# Patient Record
Sex: MALE | Race: OTHER | Employment: FULL TIME | URBAN - METROPOLITAN AREA
[De-identification: names, ages, dates, MRNs, and addresses within clinical notes are randomized per-mention and may not be internally consistent; named-entity substitution may affect disease eponyms.]

---

## 2017-01-02 ENCOUNTER — APPOINTMENT (OUTPATIENT)
Dept: PHYSICAL THERAPY | Age: 37
End: 2017-01-02
Attending: ORTHOPAEDIC SURGERY

## 2017-01-04 ENCOUNTER — APPOINTMENT (OUTPATIENT)
Dept: PHYSICAL THERAPY | Age: 37
End: 2017-01-04
Attending: ORTHOPAEDIC SURGERY

## 2017-02-02 RX ORDER — TRIAMTERENE AND HYDROCHLOROTHIAZIDE 37.5; 25 MG/1; MG/1
1 CAPSULE ORAL EVERY MORNING
Qty: 30 CAPSULE | Refills: 0 | Status: SHIPPED | OUTPATIENT
Start: 2017-02-02 | End: 2017-03-03

## 2017-02-09 ENCOUNTER — HOSPITAL ENCOUNTER (OUTPATIENT)
Age: 37
Discharge: HOME | End: 2017-02-09
Payer: COMMERCIAL

## 2017-02-09 VITALS
SYSTOLIC BLOOD PRESSURE: 146 MMHG | OXYGEN SATURATION: 98 % | DIASTOLIC BLOOD PRESSURE: 93 MMHG | TEMPERATURE: 98 F | RESPIRATION RATE: 16 BRPM | HEART RATE: 85 BPM

## 2017-02-09 DIAGNOSIS — S61.411A LACERATION OF HAND, RIGHT, INITIAL ENCOUNTER: Primary | ICD-10-CM

## 2017-02-09 PROCEDURE — 12002 RPR S/N/AX/GEN/TRNK2.6-7.5CM: CPT

## 2017-02-09 PROCEDURE — 99214 OFFICE O/P EST MOD 30 MIN: CPT

## 2017-02-09 PROCEDURE — 90471 IMMUNIZATION ADMIN: CPT

## 2017-02-09 PROCEDURE — 96372 THER/PROPH/DIAG INJ SC/IM: CPT

## 2017-02-09 RX ORDER — CEPHALEXIN 500 MG/1
500 TABLET ORAL 4 TIMES DAILY
Qty: 40 TABLET | Refills: 0 | Status: SHIPPED | OUTPATIENT
Start: 2017-02-09 | End: 2017-02-19

## 2017-02-09 NOTE — ED PROVIDER NOTES
Patient Seen in: THE [facility] In [place]    History   Patient presents with:  Laceration Abrasion (integumentary)    Stated Complaint: r hand knuckle lac    HPI    42 yo male here with c/o a laceration to the R hand MCP joint after he stuck Current:/93 mmHg  Pulse 85  Temp(Src) 98 °F (36.7 °C) (Temporal)  Resp 16  SpO2 98%        Physical Exam   Constitutional: He is oriented to person, place, and time. He appears well-developed and well-nourished. No distress.    HENT:   Head: Normoceph Current Discharge Medication List    START taking these medications    Cephalexin 500 MG Oral Tab  Take 500 mg by mouth 4 (four) times daily.   Qty: 40 tablet Refills: 0

## 2017-02-09 NOTE — ED INITIAL ASSESSMENT (HPI)
States just sustained laceration to right 3rd finger mp reaching under seat of his car - \"hit something\"  Bleeding controlled. Laceration smooth.

## 2017-02-11 ENCOUNTER — APPOINTMENT (OUTPATIENT)
Dept: LAB | Age: 37
End: 2017-02-11
Attending: INTERNAL MEDICINE
Payer: COMMERCIAL

## 2017-02-11 DIAGNOSIS — E78.1 HYPERTRIGLYCERIDEMIA WITHOUT HYPERCHOLESTEROLEMIA: ICD-10-CM

## 2017-02-11 DIAGNOSIS — IMO0001 ELEVATED BLOOD PRESSURE: ICD-10-CM

## 2017-02-11 DIAGNOSIS — Z00.00 PREVENTATIVE HEALTH CARE: ICD-10-CM

## 2017-02-11 LAB
BUN BLD-MCNC: 13 MG/DL (ref 8–20)
CALCIUM BLD-MCNC: 8.5 MG/DL (ref 8.3–10.3)
CHLORIDE: 102 MMOL/L (ref 101–111)
CHOLEST SMN-MCNC: 135 MG/DL (ref ?–200)
CO2: 28 MMOL/L (ref 22–32)
CREAT BLD-MCNC: 1.31 MG/DL (ref 0.7–1.3)
GLUCOSE BLD-MCNC: 83 MG/DL (ref 70–99)
HDLC SERPL-MCNC: 55 MG/DL (ref 45–?)
HDLC SERPL: 2.45 {RATIO} (ref ?–4.97)
LDLC SERPL CALC-MCNC: 48 MG/DL (ref ?–130)
NONHDLC SERPL-MCNC: 80 MG/DL (ref ?–130)
POTASSIUM SERPL-SCNC: 3.6 MMOL/L (ref 3.6–5.1)
SODIUM SERPL-SCNC: 137 MMOL/L (ref 136–144)
TRIGLYCERIDES: 159 MG/DL (ref ?–150)
VLDL: 32 MG/DL (ref 5–40)

## 2017-02-11 PROCEDURE — 80048 BASIC METABOLIC PNL TOTAL CA: CPT

## 2017-02-11 PROCEDURE — 80061 LIPID PANEL: CPT

## 2017-02-11 PROCEDURE — 36415 COLL VENOUS BLD VENIPUNCTURE: CPT

## 2017-02-15 ENCOUNTER — OFFICE VISIT (OUTPATIENT)
Dept: INTERNAL MEDICINE CLINIC | Facility: CLINIC | Age: 37
End: 2017-02-15

## 2017-02-15 VITALS
HEART RATE: 95 BPM | OXYGEN SATURATION: 97 % | BODY MASS INDEX: 39.62 KG/M2 | DIASTOLIC BLOOD PRESSURE: 78 MMHG | WEIGHT: 267.5 LBS | SYSTOLIC BLOOD PRESSURE: 126 MMHG | TEMPERATURE: 98 F | RESPIRATION RATE: 16 BRPM | HEIGHT: 69 IN

## 2017-02-15 DIAGNOSIS — Z00.00 ENCOUNTER FOR PREVENTIVE HEALTH EXAMINATION: Primary | ICD-10-CM

## 2017-02-15 PROCEDURE — 99395 PREV VISIT EST AGE 18-39: CPT | Performed by: INTERNAL MEDICINE

## 2017-02-15 RX ORDER — CEPHALEXIN 500 MG/1
CAPSULE ORAL
COMMUNITY
Start: 2017-02-09 | End: 2017-02-15

## 2017-02-15 NOTE — PATIENT INSTRUCTIONS
We performed your physical today. Your lab work looks fine. Two things were slightly off - triglycerides (159, normal <150) and creatinine (kidney test, 1.31, normal <1.30). We will keep an eye on these for now.   Keep the focus on diet and exercise for

## 2017-02-15 NOTE — PROGRESS NOTES
Navdeep Huntley is a 43year old male. HPI:   Patient presents with:  Physical  Patient presents for complete physical/wellness examination. He had lab work done earlier in the week. He is trying to increase his activity levels.   Starting with a person 126/78 mmHg  Pulse 95  Temp(Src) 98.2 °F (36.8 °C) (Oral)  Resp 16  Ht 69\"  Wt 267 lb 8 oz  BMI 39.48 kg/m2  SpO2 97%  GENERAL: Alert and oriented, well developed, well nourished,in no apparent distress  SKIN: well healing wound right hand/knuckle, suture

## 2017-02-18 ENCOUNTER — OFFICE VISIT (OUTPATIENT)
Dept: INTERNAL MEDICINE CLINIC | Facility: CLINIC | Age: 37
End: 2017-02-18

## 2017-02-18 VITALS
BODY MASS INDEX: 37.35 KG/M2 | OXYGEN SATURATION: 95 % | SYSTOLIC BLOOD PRESSURE: 128 MMHG | DIASTOLIC BLOOD PRESSURE: 88 MMHG | HEART RATE: 87 BPM | HEIGHT: 71 IN | WEIGHT: 266.75 LBS | TEMPERATURE: 99 F

## 2017-02-18 DIAGNOSIS — Z51.89 VISIT FOR WOUND CHECK: Primary | ICD-10-CM

## 2017-02-18 DIAGNOSIS — Z48.02 VISIT FOR SUTURE REMOVAL: ICD-10-CM

## 2017-02-18 DIAGNOSIS — I10 ESSENTIAL HYPERTENSION: ICD-10-CM

## 2017-02-18 PROCEDURE — 99213 OFFICE O/P EST LOW 20 MIN: CPT | Performed by: INTERNAL MEDICINE

## 2017-02-18 NOTE — PROGRESS NOTES
Shaune Bamberger is a 43year old male. HPI:   Patient presents with:  Laceration Abrasion (integumentary)  Patient presents for wound check. He had a laceration last week, was sutured at immediate care on 2/9/17.   Still with some mild erythema around woun well  HEENT: atraumatic, PERRLA, EOMI, normal lid and conjunctiva  LUNGS: clear to auscultation bilaterally, no wheezing/rubs  CARDIO: RRR without murmurs. No clubbing, cyanosis or edema.   GI: soft non tender nondistended no hepatosplenomegaly, bowel soun

## 2017-03-04 RX ORDER — TRIAMTERENE AND HYDROCHLOROTHIAZIDE 37.5; 25 MG/1; MG/1
CAPSULE ORAL
Qty: 30 CAPSULE | Refills: 0 | Status: SHIPPED | OUTPATIENT
Start: 2017-03-04 | End: 2017-04-02

## 2017-04-03 RX ORDER — TRIAMTERENE AND HYDROCHLOROTHIAZIDE 37.5; 25 MG/1; MG/1
CAPSULE ORAL
Qty: 30 CAPSULE | Refills: 0 | Status: SHIPPED | OUTPATIENT
Start: 2017-04-03 | End: 2017-04-26

## 2017-04-26 RX ORDER — TRIAMTERENE AND HYDROCHLOROTHIAZIDE 37.5; 25 MG/1; MG/1
CAPSULE ORAL
Qty: 90 CAPSULE | Refills: 1 | Status: SHIPPED | OUTPATIENT
Start: 2017-04-26 | End: 2017-05-01

## 2017-04-28 ENCOUNTER — TELEPHONE (OUTPATIENT)
Dept: INTERNAL MEDICINE CLINIC | Facility: CLINIC | Age: 37
End: 2017-04-28

## 2017-04-28 NOTE — TELEPHONE ENCOUNTER
Pt has had insurance change recently and he needs [facility] RX refill that was sent for Triamterene-HCTZ 37.5-25 MG Oral Cap to be cancelled at [place] and resent to [facility] in [address] today if possible.   Call if any questions

## 2017-05-01 RX ORDER — TRIAMTERENE AND HYDROCHLOROTHIAZIDE 37.5; 25 MG/1; MG/1
CAPSULE ORAL
Qty: 90 CAPSULE | Refills: 1 | Status: SHIPPED | OUTPATIENT
Start: 2017-05-01 | End: 2017-11-30

## 2017-05-01 NOTE — TELEPHONE ENCOUNTER
Rx cancelled out at [facility] and sent to [facility] per pt request.

## 2017-05-05 RX ORDER — TRIAMTERENE AND HYDROCHLOROTHIAZIDE 37.5; 25 MG/1; MG/1
CAPSULE ORAL
Qty: 30 CAPSULE | Refills: 0 | OUTPATIENT
Start: 2017-05-05

## 2017-11-29 RX ORDER — TRIAMTERENE AND HYDROCHLOROTHIAZIDE 37.5; 25 MG/1; MG/1
CAPSULE ORAL
Qty: 90 CAPSULE | Refills: 1 | Status: CANCELLED | OUTPATIENT
Start: 2017-11-29

## 2017-11-30 ENCOUNTER — LAB ENCOUNTER (OUTPATIENT)
Dept: LAB | Age: 37
End: 2017-11-30
Attending: INTERNAL MEDICINE
Payer: COMMERCIAL

## 2017-11-30 ENCOUNTER — OFFICE VISIT (OUTPATIENT)
Dept: INTERNAL MEDICINE CLINIC | Facility: CLINIC | Age: 37
End: 2017-11-30

## 2017-11-30 VITALS
RESPIRATION RATE: 20 BRPM | HEIGHT: 70 IN | DIASTOLIC BLOOD PRESSURE: 74 MMHG | OXYGEN SATURATION: 98 % | SYSTOLIC BLOOD PRESSURE: 122 MMHG | HEART RATE: 88 BPM | BODY MASS INDEX: 39.16 KG/M2 | WEIGHT: 273.5 LBS | TEMPERATURE: 99 F

## 2017-11-30 DIAGNOSIS — R41.3 MEMORY PROBLEM: ICD-10-CM

## 2017-11-30 DIAGNOSIS — R41.3 MEMORY PROBLEM: Primary | ICD-10-CM

## 2017-11-30 DIAGNOSIS — I10 ESSENTIAL HYPERTENSION: ICD-10-CM

## 2017-11-30 DIAGNOSIS — E66.01 SEVERE OBESITY (BMI 35.0-35.9 WITH COMORBIDITY) (HCC): ICD-10-CM

## 2017-11-30 DIAGNOSIS — E78.1 HYPERTRIGLYCERIDEMIA WITHOUT HYPERCHOLESTEROLEMIA: ICD-10-CM

## 2017-11-30 PROCEDURE — 85025 COMPLETE CBC W/AUTO DIFF WBC: CPT

## 2017-11-30 PROCEDURE — 90471 IMMUNIZATION ADMIN: CPT | Performed by: INTERNAL MEDICINE

## 2017-11-30 PROCEDURE — 90686 IIV4 VACC NO PRSV 0.5 ML IM: CPT | Performed by: INTERNAL MEDICINE

## 2017-11-30 PROCEDURE — 84443 ASSAY THYROID STIM HORMONE: CPT

## 2017-11-30 PROCEDURE — 82306 VITAMIN D 25 HYDROXY: CPT

## 2017-11-30 PROCEDURE — 82607 VITAMIN B-12: CPT

## 2017-11-30 PROCEDURE — 36415 COLL VENOUS BLD VENIPUNCTURE: CPT

## 2017-11-30 PROCEDURE — 99214 OFFICE O/P EST MOD 30 MIN: CPT | Performed by: INTERNAL MEDICINE

## 2017-11-30 PROCEDURE — 82746 ASSAY OF FOLIC ACID SERUM: CPT

## 2017-11-30 PROCEDURE — 80053 COMPREHEN METABOLIC PANEL: CPT

## 2017-11-30 RX ORDER — TRIAMTERENE AND HYDROCHLOROTHIAZIDE 37.5; 25 MG/1; MG/1
CAPSULE ORAL
Qty: 90 CAPSULE | Refills: 2 | Status: SHIPPED | OUTPATIENT
Start: 2017-11-30 | End: 2018-08-29

## 2017-11-30 NOTE — PROGRESS NOTES
Zunilda Melendrez is a 42year old male. HPI:   Patient presents with:  Medication Follow-Up  Patient presents for follow-up on chronic medical issues. Blood pressure stable, at goal. Compliant with medications. Hyperlipidemia lifestyle controlled.  Elevate oz  02/18/17 : 266 lb 12 oz  02/15/17 : 267 lb 8 oz  12/30/16 : 266 lb 8 oz  12/02/16 : 265 lb  10/27/16 : 266 lb 12 oz    EXAM:   /74 (BP Location: Right arm, Patient Position: Sitting, Cuff Size: large)   Pulse 88   Temp 99 °F (37.2 °C) (Oral)   Re Sammy Echeverria, PT as Physical Therapist (Physical Therapy)  The patient indicates understanding of these issues and agrees to the plan.   The patient is asked to return to clinic in 6 months with myself for follow up on chronic issues, or earlier if acute issues

## 2017-11-30 NOTE — PATIENT INSTRUCTIONS
- Get blood work done now  - Let us know when you are ready to get the CT scan done    It was a pleasure seeing you in the clinic today. Thank you for choosing the [facility] office for your healthcare needs.  Please call at [phone]

## 2018-08-29 RX ORDER — TRIAMTERENE AND HYDROCHLOROTHIAZIDE 37.5; 25 MG/1; MG/1
CAPSULE ORAL
Qty: 90 CAPSULE | Refills: 0 | Status: SHIPPED | OUTPATIENT
Start: 2018-08-29 | End: 2018-11-30

## 2018-08-29 NOTE — TELEPHONE ENCOUNTER
Medication(s) to Refill:   Pending Prescriptions Disp Refills    TRIAMTERENE-HCTZ 37.5-25 MG Oral Cap [Pharmacy Med Name: TRIAMTERENE-HCTZ 37.5-25 MG CP] 90 capsule 2     Sig: TAKE 1 CAPSULE BY MOUTH EVERY MORNING.            Protocol Failed      Last Time

## 2018-10-02 ENCOUNTER — OFFICE VISIT (OUTPATIENT)
Dept: INTERNAL MEDICINE CLINIC | Facility: CLINIC | Age: 38
End: 2018-10-02
Payer: COMMERCIAL

## 2018-10-02 ENCOUNTER — HOSPITAL ENCOUNTER (OUTPATIENT)
Dept: ULTRASOUND IMAGING | Age: 38
Discharge: HOME | End: 2018-10-02
Attending: NURSE PRACTITIONER
Payer: COMMERCIAL

## 2018-10-02 ENCOUNTER — HOSPITAL ENCOUNTER (OUTPATIENT)
Dept: GENERAL RADIOLOGY | Age: 38
Discharge: HOME | End: 2018-10-02
Attending: NURSE PRACTITIONER
Payer: COMMERCIAL

## 2018-10-02 VITALS
TEMPERATURE: 100 F | RESPIRATION RATE: 16 BRPM | SYSTOLIC BLOOD PRESSURE: 128 MMHG | DIASTOLIC BLOOD PRESSURE: 86 MMHG | WEIGHT: 262 LBS | HEIGHT: 71 IN | HEART RATE: 91 BPM | OXYGEN SATURATION: 98 % | BODY MASS INDEX: 36.68 KG/M2

## 2018-10-02 DIAGNOSIS — N50.82 SCROTAL PAIN: ICD-10-CM
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
## 2018-10-02 PROCEDURE — 99213 OFFICE O/P EST LOW 20 MIN: CPT | Performed by: NURSE PRACTITIONER

## 2018-10-02 PROCEDURE — 93975 VASCULAR STUDY: CPT | Performed by: NURSE PRACTITIONER

## 2018-10-02 PROCEDURE — 70160 X-RAY EXAM OF NASAL BONES: CPT | Performed by: NURSE PRACTITIONER

## 2018-10-02 PROCEDURE — 76870 US EXAM SCROTUM: CPT | Performed by: NURSE PRACTITIONER

## 2018-10-02 NOTE — PROGRESS NOTES
CHIEF COMPLAINT:     Patient presents with:  Groin Pain: Pt was catching for baseball and took foul tip off the cup - pain level is constant and uncomfortable  Nose Problem: fell face first  - pain level 2 to touch - no swelling noticable - pain is behind /86   Pulse 91   Temp 99.5 °F (37.5 °C) (Oral)   Resp 16   Ht 71\"   Wt 262 lb   SpO2 98%   BMI 36.54 kg/m²      GENERAL: well developed, well nourished,in no apparent distress  SKIN: no rashes,no suspicious lesions, skin's intact.  There is a scab

## 2018-10-19 ENCOUNTER — OFFICE VISIT (OUTPATIENT)
Dept: INTERNAL MEDICINE CLINIC | Facility: CLINIC | Age: 38
End: 2018-10-19
Payer: COMMERCIAL

## 2018-10-19 ENCOUNTER — APPOINTMENT (OUTPATIENT)
Dept: LAB | Age: 38
End: 2018-10-19
Attending: INTERNAL MEDICINE
Payer: COMMERCIAL

## 2018-10-19 VITALS
HEIGHT: 71 IN | TEMPERATURE: 99 F | BODY MASS INDEX: 35.7 KG/M2 | RESPIRATION RATE: 16 BRPM | SYSTOLIC BLOOD PRESSURE: 124 MMHG | DIASTOLIC BLOOD PRESSURE: 58 MMHG | HEART RATE: 74 BPM | WEIGHT: 255 LBS

## 2018-10-19 DIAGNOSIS — Z00.00 PREVENTATIVE HEALTH CARE: Primary | ICD-10-CM

## 2018-10-19 DIAGNOSIS — Z00.00 PREVENTATIVE HEALTH CARE: ICD-10-CM

## 2018-10-19 DIAGNOSIS — E78.1 HYPERTRIGLYCERIDEMIA WITHOUT HYPERCHOLESTEROLEMIA: ICD-10-CM

## 2018-10-19 DIAGNOSIS — I10 ESSENTIAL HYPERTENSION: ICD-10-CM

## 2018-10-19 PROCEDURE — 99396 PREV VISIT EST AGE 40-64: CPT | Performed by: INTERNAL MEDICINE

## 2018-10-19 PROCEDURE — 90471 IMMUNIZATION ADMIN: CPT | Performed by: INTERNAL MEDICINE

## 2018-10-19 PROCEDURE — 80061 LIPID PANEL: CPT

## 2018-10-19 PROCEDURE — 90686 IIV4 VACC NO PRSV 0.5 ML IM: CPT | Performed by: INTERNAL MEDICINE

## 2018-10-19 PROCEDURE — 80053 COMPREHEN METABOLIC PANEL: CPT

## 2018-10-19 PROCEDURE — 36415 COLL VENOUS BLD VENIPUNCTURE: CPT

## 2018-10-19 PROCEDURE — 83036 HEMOGLOBIN GLYCOSYLATED A1C: CPT

## 2018-10-19 NOTE — PATIENT INSTRUCTIONS
- We gave you your flu shot today  - Get blood work done today  - If your abdominal pain continues, schedule abdominal ultrasound ([phone]). - If your testicular pain continues, follow up with Urology Elisha Villanueva or Dayron Drake).   - Your x-ray

## 2018-10-19 NOTE — PROGRESS NOTES
Js Celis is a 34year old male. HPI:   Patient presents with:  CPX: pt is not fasting. drank 1oz of a drink  Patient presents for CPX/wellness examination. Diet: Starting a meal replacement diet. Exercise: Plays a lot of baseball.    Vision: Cheryl Benson his mother; benign brain lesion in his mother. Social:  reports that  has never smoked. he has never used smokeless tobacco. He reports that he does not drink alcohol or use drugs.   Wt Readings from Last 6 Encounters:  10/19/18 : 255 lb  10/02/18 : 262 lb Therapy)  The patient indicates understanding of these issues and agrees to the plan. The patient is asked to return to clinic in 6-12 months with myself with Dr. Jyoti Leon MD for follow up on chronic issues, or earlier if acute issues arise.     Rid

## 2018-10-21 DIAGNOSIS — E87.8 HYPOCHLOREMIA: ICD-10-CM

## 2018-10-21 DIAGNOSIS — R79.89 ELEVATED SERUM CREATININE: Primary | ICD-10-CM

## 2018-10-21 DIAGNOSIS — E87.6 HYPOKALEMIA: ICD-10-CM

## 2018-11-30 RX ORDER — TRIAMTERENE AND HYDROCHLOROTHIAZIDE 37.5; 25 MG/1; MG/1
1 CAPSULE ORAL EVERY MORNING
Qty: 90 CAPSULE | Refills: 0 | Status: SHIPPED | OUTPATIENT
Start: 2018-11-30 | End: 2019-02-21

## 2018-11-30 NOTE — TELEPHONE ENCOUNTER
Refill requested:   Requested Prescriptions     Pending Prescriptions Disp Refills   • Triamterene-HCTZ 37.5-25 MG Oral Cap [Pharmacy Med Name: Rebeca Bobby 37.5-25 MG CP] 90 capsule 0     Sig: Take 1 capsule by mouth every morning.      Passed protocol

## 2019-02-21 RX ORDER — TRIAMTERENE AND HYDROCHLOROTHIAZIDE 37.5; 25 MG/1; MG/1
1 CAPSULE ORAL EVERY MORNING
Qty: 90 CAPSULE | Refills: 0 | Status: SHIPPED | OUTPATIENT
Start: 2019-02-21 | End: 2019-05-29

## 2019-02-21 NOTE — TELEPHONE ENCOUNTER
Refill requested:   Requested Prescriptions     Pending Prescriptions Disp Refills   • Triamterene-HCTZ 37.5-25 MG Oral Cap 90 capsule 0     Sig: Take 1 capsule by mouth every morning.      Passed protocol    Last refill: 11/30/2018 #90 NR    Blood Pressure

## 2019-04-29 ENCOUNTER — OFFICE VISIT (OUTPATIENT)
Dept: INTERNAL MEDICINE CLINIC | Facility: CLINIC | Age: 39
End: 2019-04-29
Payer: COMMERCIAL

## 2019-04-29 ENCOUNTER — TELEPHONE (OUTPATIENT)
Dept: INTERNAL MEDICINE CLINIC | Facility: CLINIC | Age: 39
End: 2019-04-29

## 2019-04-29 VITALS
HEART RATE: 80 BPM | SYSTOLIC BLOOD PRESSURE: 110 MMHG | DIASTOLIC BLOOD PRESSURE: 70 MMHG | HEIGHT: 70.25 IN | RESPIRATION RATE: 16 BRPM | WEIGHT: 240.5 LBS | TEMPERATURE: 99 F | BODY MASS INDEX: 34.43 KG/M2

## 2019-04-29 DIAGNOSIS — I10 ESSENTIAL HYPERTENSION: Chronic | ICD-10-CM

## 2019-04-29 DIAGNOSIS — J06.9 ACUTE UPPER RESPIRATORY INFECTION: Primary | ICD-10-CM

## 2019-04-29 PROCEDURE — 99213 OFFICE O/P EST LOW 20 MIN: CPT | Performed by: INTERNAL MEDICINE

## 2019-04-29 RX ORDER — AZITHROMYCIN 250 MG/1
TABLET, FILM COATED ORAL
Qty: 6 TABLET | Refills: 0 | Status: SHIPPED | OUTPATIENT
Start: 2019-04-29 | End: 2019-07-19 | Stop reason: ALTCHOICE

## 2019-04-29 NOTE — TELEPHONE ENCOUNTER
Pt inquiring if Dr Heather Gutierrez can squeeze him in today. Pt c/o cough with clear to yellow phlegm and chest congestion since last Thursday. Pt states he had a bout of labored breathing last night. No c/o chest pain, wheezing, fever or chills.

## 2019-04-29 NOTE — TELEPHONE ENCOUNTER
Can he come in at 6:15? Otherwise he can see Scarlett/Velma/Dr. Elgin Nieves tomorrow if they have openings.

## 2019-04-29 NOTE — PATIENT INSTRUCTIONS
- Start antibiotics (azithromycin). Take as prescribed. - Continue with over the counter medications as needed for cough  - Let us know if you are not better after finishing the antibiotics. We may try a round of steroid tablets at that point.     It wa

## 2019-04-29 NOTE — PROGRESS NOTES
Michelle Meza is a 34year old male. HPI:   Patient presents with:  Cough  Chest Congestion  Patient presents with acute upper respiratory symptoms. Productive cough (yellow phlegm), shortness of breath, nasal and sinus congestion. No wheezing, fevers. atraumatic, PERRLA, EOMI, normal lid and conjunctiva, mild sinus tenderness, congestion behind tympanic membranes  LUNGS: clear to auscultation bilaterally, no wheezing/rubs  CARDIO: RRR without murmurs. No clubbing, cyanosis or edema.   GI: soft non tende

## 2019-05-29 RX ORDER — TRIAMTERENE AND HYDROCHLOROTHIAZIDE 37.5; 25 MG/1; MG/1
CAPSULE ORAL
Qty: 90 CAPSULE | Refills: 0 | Status: SHIPPED | OUTPATIENT
Start: 2019-05-29 | End: 2019-09-22

## 2019-05-29 NOTE — TELEPHONE ENCOUNTER
Passed protocol     Last refill:  2/21/2019 #90 NR    LOV:   4/29/2019 Dr Sheree Rizvi RTC 6-12 months   2. Essential hypertension  At goal, excellent blood pressure. Continue triamterene-HCTZ 37.5-25 mg qd.      No FOV scheduled

## 2019-07-12 ENCOUNTER — APPOINTMENT (OUTPATIENT)
Dept: LAB | Age: 39
End: 2019-07-12
Attending: INTERNAL MEDICINE
Payer: COMMERCIAL

## 2019-07-12 DIAGNOSIS — E87.8 HYPOCHLOREMIA: ICD-10-CM

## 2019-07-12 DIAGNOSIS — R79.89 ELEVATED SERUM CREATININE: ICD-10-CM

## 2019-07-12 DIAGNOSIS — E87.6 HYPOKALEMIA: ICD-10-CM

## 2019-07-12 LAB
ANION GAP SERPL CALC-SCNC: 6 MMOL/L (ref 0–18)
BUN BLD-MCNC: 18 MG/DL (ref 7–18)
BUN/CREAT SERPL: 14 (ref 10–20)
CALCIUM BLD-MCNC: 9.2 MG/DL (ref 8.5–10.1)
CHLORIDE SERPL-SCNC: 102 MMOL/L (ref 98–112)
CO2 SERPL-SCNC: 29 MMOL/L (ref 21–32)
CREAT BLD-MCNC: 1.29 MG/DL (ref 0.7–1.3)
GLUCOSE BLD-MCNC: 101 MG/DL (ref 70–99)
OSMOLALITY SERPL CALC.SUM OF ELEC: 286 MOSM/KG (ref 275–295)
POTASSIUM SERPL-SCNC: 3.5 MMOL/L (ref 3.5–5.1)
SODIUM SERPL-SCNC: 137 MMOL/L (ref 136–145)

## 2019-07-12 PROCEDURE — 80048 BASIC METABOLIC PNL TOTAL CA: CPT

## 2019-07-12 PROCEDURE — 36415 COLL VENOUS BLD VENIPUNCTURE: CPT

## 2019-07-19 ENCOUNTER — OFFICE VISIT (OUTPATIENT)
Dept: INTERNAL MEDICINE CLINIC | Facility: CLINIC | Age: 39
End: 2019-07-19
Payer: COMMERCIAL

## 2019-07-19 VITALS
BODY MASS INDEX: 34.09 KG/M2 | TEMPERATURE: 98 F | OXYGEN SATURATION: 97 % | HEIGHT: 71 IN | SYSTOLIC BLOOD PRESSURE: 132 MMHG | DIASTOLIC BLOOD PRESSURE: 74 MMHG | WEIGHT: 243.5 LBS | HEART RATE: 80 BPM | RESPIRATION RATE: 16 BRPM

## 2019-07-19 DIAGNOSIS — R79.89 ELEVATED SERUM CREATININE: ICD-10-CM

## 2019-07-19 DIAGNOSIS — E78.1 HYPERTRIGLYCERIDEMIA WITHOUT HYPERCHOLESTEROLEMIA: Chronic | ICD-10-CM

## 2019-07-19 DIAGNOSIS — E87.6 HYPOKALEMIA: ICD-10-CM

## 2019-07-19 DIAGNOSIS — I10 ESSENTIAL HYPERTENSION: Primary | Chronic | ICD-10-CM

## 2019-07-19 DIAGNOSIS — Z00.00 PREVENTATIVE HEALTH CARE: ICD-10-CM

## 2019-07-19 PROCEDURE — 99213 OFFICE O/P EST LOW 20 MIN: CPT | Performed by: INTERNAL MEDICINE

## 2019-07-19 NOTE — PATIENT INSTRUCTIONS
- Kidney, potassium, chloride levels are back to normal.  We will continue to monitor for now. - Blood pressure looks good. - Follow up with me in 6 months. Get blood work done (fasting) at least 1-2 days before this appointment.   - Follow up with me ea

## 2019-07-19 NOTE — PROGRESS NOTES
David Henry is a 34year old male. HPI:   Patient presents with: Follow - Up: Lab f/u    Patient presents for follow up on lab results. On his last metabolic panel, he had mild hypokalemia, elevated creatinine.   Repeat metabolic panel last week showed atraumatic, PERRLA, EOMI, normal lid and conjunctiva  LUNGS: clear to auscultation bilaterally, no wheezing/rubs  CARDIO: RRR without murmurs. No clubbing, cyanosis or edema.   GI: soft non tender nondistended no hepatosplenomegaly, bowel sounds throughout

## 2019-09-23 RX ORDER — TRIAMTERENE AND HYDROCHLOROTHIAZIDE 37.5; 25 MG/1; MG/1
CAPSULE ORAL
Qty: 90 CAPSULE | Refills: 0 | Status: SHIPPED | OUTPATIENT
Start: 2019-09-23 | End: 2020-01-07

## 2019-09-23 NOTE — TELEPHONE ENCOUNTER
Passed protocol     Last refill:  5/19/2019 Triamterene HCTZ 37.5 - 25 mg #90 NR      LOV:   7/19/2019 Dr Arnel Woodard RTC 6 months  3. Essential hypertension  At goal blood pressure. Continue triamterene-HCTZ 37.5-25 mg qd.   Will check metabolic panel with ne

## 2020-01-01 ENCOUNTER — MED REC SCAN ONLY (OUTPATIENT)
Dept: INTERNAL MEDICINE CLINIC | Facility: CLINIC | Age: 40
End: 2020-01-01

## 2020-01-06 DIAGNOSIS — I10 ESSENTIAL HYPERTENSION: Primary | Chronic | ICD-10-CM

## 2020-01-07 RX ORDER — TRIAMTERENE AND HYDROCHLOROTHIAZIDE 37.5; 25 MG/1; MG/1
CAPSULE ORAL
Qty: 90 CAPSULE | Refills: 1 | Status: SHIPPED | OUTPATIENT
Start: 2020-01-07 | End: 2020-06-30

## 2020-01-07 NOTE — TELEPHONE ENCOUNTER
Triamterene hctz 37.5-25 mg 1 cap daily filled 9-23-19 90 with 0 refills     LOV 7-19-19   return to clinic in 6 months   No upcoming apt on file   Labs 10-19-18     LVM to call due for OV for further refill.

## 2020-01-16 ENCOUNTER — OFFICE VISIT (OUTPATIENT)
Dept: INTERNAL MEDICINE CLINIC | Facility: CLINIC | Age: 40
End: 2020-01-16
Payer: COMMERCIAL

## 2020-01-16 VITALS
RESPIRATION RATE: 16 BRPM | WEIGHT: 259 LBS | DIASTOLIC BLOOD PRESSURE: 80 MMHG | BODY MASS INDEX: 36.26 KG/M2 | SYSTOLIC BLOOD PRESSURE: 118 MMHG | HEART RATE: 76 BPM | HEIGHT: 71 IN | TEMPERATURE: 99 F

## 2020-01-16 DIAGNOSIS — E66.01 SEVERE OBESITY (BMI 35.0-35.9 WITH COMORBIDITY) (HCC): ICD-10-CM

## 2020-01-16 DIAGNOSIS — Z91.81 STATUS POST FALL: ICD-10-CM

## 2020-01-16 DIAGNOSIS — R07.89 LEFT-SIDED CHEST WALL PAIN: Primary | ICD-10-CM

## 2020-01-16 DIAGNOSIS — E78.1 PURE HYPERTRIGLYCERIDEMIA: Chronic | ICD-10-CM

## 2020-01-16 DIAGNOSIS — I10 ESSENTIAL HYPERTENSION: Chronic | ICD-10-CM

## 2020-01-16 PROCEDURE — 99214 OFFICE O/P EST MOD 30 MIN: CPT | Performed by: INTERNAL MEDICINE

## 2020-01-16 NOTE — PROGRESS NOTES
Napoleon Connelly is a 37year old male. HPI:   Patient presents with:  Pain: Pt c/o of left side rib pain. He states he was curling and fell on ice. More pain with deep breaths, coughing and sneezing. Worse with stretching and laying down.      Patient present lb 8 oz (110.5 kg)  04/29/19 : 240 lb 8 oz (109.1 kg)  10/19/18 : 255 lb (115.7 kg)  10/02/18 : 262 lb (118.8 kg)  11/30/17 : 273 lb 8 oz (124.1 kg)    EXAM:   /80 (BP Location: Left arm, Patient Position: Sitting, Cuff Size: adult)   Pulse 76   Temp asked to return to clinic in 6-12 months with myself for follow up on chronic issues, or earlier if acute issues arise.     Selina Randall MD

## 2020-01-16 NOTE — PATIENT INSTRUCTIONS
- I suspect you have a muscle/chest wall bruise. - We will check an x-ray to make sure you don't have a broken rib.   - If the x-ray is normal, I will recommend that you take over the counter anti-inflammatories (Motrin/aleve/etc as well as Tylenol, alte

## 2020-01-17 ENCOUNTER — TELEPHONE (OUTPATIENT)
Dept: INTERNAL MEDICINE CLINIC | Facility: CLINIC | Age: 40
End: 2020-01-17

## 2020-01-17 NOTE — TELEPHONE ENCOUNTER
Called patient, discussed x-ray result. Supportive therapy for now, OTC NSAID's as needed. XR read out sent to scanning.

## 2020-01-17 NOTE — TELEPHONE ENCOUNTER
Received chest x-ray results from 1/16/2020 done at [facility]. Impression:   No left rib fractures are identified. No cardiopulmonary abnormalities are identified. Minimal to mild thoracic spine degenerative changes.

## 2020-06-30 DIAGNOSIS — I10 ESSENTIAL HYPERTENSION: Chronic | ICD-10-CM

## 2020-06-30 RX ORDER — TRIAMTERENE AND HYDROCHLOROTHIAZIDE 37.5; 25 MG/1; MG/1
CAPSULE ORAL
Qty: 90 CAPSULE | Refills: 1 | Status: SHIPPED | OUTPATIENT
Start: 2020-06-30 | End: 2020-12-16

## 2020-06-30 NOTE — TELEPHONE ENCOUNTER
Triamterene hctz 37.5-25 mg 1 cap daily filled 1-7-20 90 with 1 refill     LOV 1-16-20   return to clinic in 6-12 months   No upcoming apt on file   Labs 10-19-18

## 2020-12-16 DIAGNOSIS — I10 ESSENTIAL HYPERTENSION: Chronic | ICD-10-CM

## 2020-12-16 RX ORDER — TRIAMTERENE AND HYDROCHLOROTHIAZIDE 37.5; 25 MG/1; MG/1
CAPSULE ORAL
Qty: 90 CAPSULE | Refills: 1 | Status: SHIPPED | OUTPATIENT
Start: 2020-12-16 | End: 2021-07-20

## 2020-12-16 NOTE — TELEPHONE ENCOUNTER
Failed protocol     Last refill:  6/30/2020 Triamterene HCTZ #90 1R    LOV:   1/16/2020 Dr Geovanna Hinojosa RTC 6-12 months  3. Essential hypertension  At goal blood pressure. Continue triamterene-HCTZ 37.5-25 mg qd.  Creatinine/electrolytes were off last year but

## 2021-03-08 ENCOUNTER — HOSPITAL ENCOUNTER (OUTPATIENT)
Dept: GENERAL RADIOLOGY | Age: 41
Discharge: HOME | End: 2021-03-08
Attending: INTERNAL MEDICINE
Payer: COMMERCIAL

## 2021-03-08 ENCOUNTER — OFFICE VISIT (OUTPATIENT)
Dept: INTERNAL MEDICINE CLINIC | Facility: CLINIC | Age: 41
End: 2021-03-08
Payer: COMMERCIAL

## 2021-03-08 VITALS
BODY MASS INDEX: 38.37 KG/M2 | RESPIRATION RATE: 16 BRPM | DIASTOLIC BLOOD PRESSURE: 80 MMHG | WEIGHT: 268 LBS | HEART RATE: 88 BPM | SYSTOLIC BLOOD PRESSURE: 130 MMHG | OXYGEN SATURATION: 98 % | TEMPERATURE: 99 F | HEIGHT: 70 IN

## 2021-03-08 DIAGNOSIS — I10 ESSENTIAL HYPERTENSION: ICD-10-CM

## 2021-03-08 DIAGNOSIS — M25.531 PAIN IN RIGHT WRIST: ICD-10-CM

## 2021-03-08 DIAGNOSIS — Z00.00 ENCOUNTER FOR PREVENTATIVE ADULT HEALTH CARE EXAMINATION: Primary | ICD-10-CM

## 2021-03-08 DIAGNOSIS — E78.1 PURE HYPERTRIGLYCERIDEMIA: ICD-10-CM

## 2021-03-08 DIAGNOSIS — R10.30 LOWER ABDOMINAL PAIN: ICD-10-CM

## 2021-03-08 DIAGNOSIS — Z12.5 SCREENING FOR MALIGNANT NEOPLASM OF PROSTATE: ICD-10-CM

## 2021-03-08 DIAGNOSIS — E55.9 VITAMIN D INSUFFICIENCY: ICD-10-CM

## 2021-03-08 PROCEDURE — 3079F DIAST BP 80-89 MM HG: CPT | Performed by: INTERNAL MEDICINE

## 2021-03-08 PROCEDURE — 3008F BODY MASS INDEX DOCD: CPT | Performed by: INTERNAL MEDICINE

## 2021-03-08 PROCEDURE — 3075F SYST BP GE 130 - 139MM HG: CPT | Performed by: INTERNAL MEDICINE

## 2021-03-08 PROCEDURE — 73110 X-RAY EXAM OF WRIST: CPT | Performed by: INTERNAL MEDICINE

## 2021-03-08 PROCEDURE — 99396 PREV VISIT EST AGE 40-64: CPT | Performed by: INTERNAL MEDICINE

## 2021-03-08 NOTE — PROGRESS NOTES
Jose M Mccollum is a 41year old male. HPI:   Patient presents with:  Physical  Wrist Pain: Pt c/o right wrist pain. No injury that he is aware of. Began around December. Patient presents for CPX/wellness examination.   Diet: trying to watch the diet  Exe (Right). Family: family history includes Diabetes in his father; Psychiatric in his mother; benign brain lesion in his mother. Social:  reports that he has never smoked.  He has never used smokeless tobacco. He reports that he does not drink alcohol and d hypertension  At goal blood pressure. Continue triamterene-HCTZ 37.5-25 mg every day. Metabolic panel ordered. - COMP METABOLIC PANEL (14); Future    4. Pure hypertriglyceridemia  Lifestyle controlled. Updated lipid panel ordered.   - HEMOGLOBIN A1C; Fu

## 2021-03-08 NOTE — PATIENT INSTRUCTIONS
- Get blood work done when fasting (8 hours). You can schedule a lab appointment ahead of time through [address] [place] [address] or [email or web address].  - Get x-ray of right wrist done.   You can see if they can do this now, otherwise get it done when you come back for labs (can

## 2021-03-19 ENCOUNTER — OFFICE VISIT (OUTPATIENT)
Dept: ORTHOPEDICS CLINIC | Facility: CLINIC | Age: 41
End: 2021-03-19
Payer: COMMERCIAL

## 2021-03-19 ENCOUNTER — LABORATORY ENCOUNTER (OUTPATIENT)
Dept: LAB | Age: 41
End: 2021-03-19
Attending: INTERNAL MEDICINE
Payer: COMMERCIAL

## 2021-03-19 VITALS — HEART RATE: 83 BPM | OXYGEN SATURATION: 100 %

## 2021-03-19 DIAGNOSIS — I10 ESSENTIAL HYPERTENSION: ICD-10-CM

## 2021-03-19 DIAGNOSIS — Z12.5 SCREENING FOR MALIGNANT NEOPLASM OF PROSTATE: ICD-10-CM

## 2021-03-19 DIAGNOSIS — E78.1 PURE HYPERTRIGLYCERIDEMIA: ICD-10-CM

## 2021-03-19 DIAGNOSIS — S69.81XA INJURY OF TRIANGULAR FIBROCARTILAGE COMPLEX (TFCC) OF RIGHT WRIST, INITIAL ENCOUNTER: Primary | ICD-10-CM

## 2021-03-19 DIAGNOSIS — Z00.00 ENCOUNTER FOR PREVENTATIVE ADULT HEALTH CARE EXAMINATION: ICD-10-CM

## 2021-03-19 DIAGNOSIS — E55.9 VITAMIN D INSUFFICIENCY: ICD-10-CM

## 2021-03-19 LAB
ALBUMIN SERPL-MCNC: 4.4 G/DL (ref 3.4–5)
ALBUMIN/GLOB SERPL: 1.3 {RATIO} (ref 1–2)
ALP LIVER SERPL-CCNC: 44 U/L
ALT SERPL-CCNC: 54 U/L
ANION GAP SERPL CALC-SCNC: 4 MMOL/L (ref 0–18)
AST SERPL-CCNC: 26 U/L (ref 15–37)
BASOPHILS # BLD AUTO: 0.04 X10(3) UL (ref 0–0.2)
BASOPHILS NFR BLD AUTO: 0.7 %
BILIRUB SERPL-MCNC: 0.9 MG/DL (ref 0.1–2)
BUN BLD-MCNC: 15 MG/DL (ref 7–18)
BUN/CREAT SERPL: 12.4 (ref 10–20)
CALCIUM BLD-MCNC: 9.2 MG/DL (ref 8.5–10.1)
CHLORIDE SERPL-SCNC: 104 MMOL/L (ref 98–112)
CHOLEST SMN-MCNC: 152 MG/DL (ref ?–200)
CO2 SERPL-SCNC: 33 MMOL/L (ref 21–32)
COMPLEXED PSA SERPL-MCNC: 0.65 NG/ML (ref ?–4)
CREAT BLD-MCNC: 1.21 MG/DL
DEPRECATED RDW RBC AUTO: 43.1 FL (ref 35.1–46.3)
EOSINOPHIL # BLD AUTO: 0.07 X10(3) UL (ref 0–0.7)
EOSINOPHIL NFR BLD AUTO: 1.3 %
ERYTHROCYTE [DISTWIDTH] IN BLOOD BY AUTOMATED COUNT: 14.8 % (ref 11–15)
EST. AVERAGE GLUCOSE BLD GHB EST-MCNC: 105 MG/DL (ref 68–126)
GLOBULIN PLAS-MCNC: 3.3 G/DL (ref 2.8–4.4)
GLUCOSE BLD-MCNC: 88 MG/DL (ref 70–99)
HBA1C MFR BLD HPLC: 5.3 % (ref ?–5.7)
HCT VFR BLD AUTO: 50.5 %
HDLC SERPL-MCNC: 49 MG/DL (ref 40–59)
HGB BLD-MCNC: 15.5 G/DL
IMM GRANULOCYTES # BLD AUTO: 0.01 X10(3) UL (ref 0–1)
IMM GRANULOCYTES NFR BLD: 0.2 %
LDLC SERPL CALC-MCNC: 69 MG/DL (ref ?–100)
LYMPHOCYTES # BLD AUTO: 1.57 X10(3) UL (ref 1–4)
LYMPHOCYTES NFR BLD AUTO: 28.1 %
M PROTEIN MFR SERPL ELPH: 7.7 G/DL (ref 6.4–8.2)
MCH RBC QN AUTO: 25.5 PG (ref 26–34)
MCHC RBC AUTO-ENTMCNC: 30.7 G/DL (ref 31–37)
MCV RBC AUTO: 83.1 FL
MONOCYTES # BLD AUTO: 0.46 X10(3) UL (ref 0.1–1)
MONOCYTES NFR BLD AUTO: 8.2 %
NEUTROPHILS # BLD AUTO: 3.43 X10 (3) UL (ref 1.5–7.7)
NEUTROPHILS # BLD AUTO: 3.43 X10(3) UL (ref 1.5–7.7)
NEUTROPHILS NFR BLD AUTO: 61.5 %
NONHDLC SERPL-MCNC: 103 MG/DL (ref ?–130)
OSMOLALITY SERPL CALC.SUM OF ELEC: 292 MOSM/KG (ref 275–295)
PATIENT FASTING Y/N/NP: YES
PATIENT FASTING Y/N/NP: YES
PLATELET # BLD AUTO: 273 10(3)UL (ref 150–450)
POTASSIUM SERPL-SCNC: 3.4 MMOL/L (ref 3.5–5.1)
RBC # BLD AUTO: 6.08 X10(6)UL
SODIUM SERPL-SCNC: 141 MMOL/L (ref 136–145)
TRIGL SERPL-MCNC: 171 MG/DL (ref 30–149)
TSI SER-ACNC: 0.54 MIU/ML (ref 0.36–3.74)
VIT D+METAB SERPL-MCNC: 28.5 NG/ML (ref 30–100)
VLDLC SERPL CALC-MCNC: 34 MG/DL (ref 0–30)
WBC # BLD AUTO: 5.6 X10(3) UL (ref 4–11)

## 2021-03-19 PROCEDURE — 80061 LIPID PANEL: CPT

## 2021-03-19 PROCEDURE — 20605 DRAIN/INJ JOINT/BURSA W/O US: CPT | Performed by: ORTHOPAEDIC SURGERY

## 2021-03-19 PROCEDURE — L3908 WHO COCK-UP NONMOLDE PRE OTS: HCPCS | Performed by: ORTHOPAEDIC SURGERY

## 2021-03-19 PROCEDURE — 84443 ASSAY THYROID STIM HORMONE: CPT

## 2021-03-19 PROCEDURE — 36415 COLL VENOUS BLD VENIPUNCTURE: CPT

## 2021-03-19 PROCEDURE — 83036 HEMOGLOBIN GLYCOSYLATED A1C: CPT

## 2021-03-19 PROCEDURE — 82306 VITAMIN D 25 HYDROXY: CPT

## 2021-03-19 PROCEDURE — 99203 OFFICE O/P NEW LOW 30 MIN: CPT | Performed by: ORTHOPAEDIC SURGERY

## 2021-03-19 PROCEDURE — 80053 COMPREHEN METABOLIC PANEL: CPT

## 2021-03-19 PROCEDURE — 85025 COMPLETE CBC W/AUTO DIFF WBC: CPT

## 2021-03-19 RX ORDER — TRIAMCINOLONE ACETONIDE 40 MG/ML
40 INJECTION, SUSPENSION INTRA-ARTICULAR; INTRAMUSCULAR ONCE
Status: COMPLETED | OUTPATIENT
Start: 2021-03-19 | End: 2021-03-19

## 2021-03-19 RX ADMIN — TRIAMCINOLONE ACETONIDE 40 MG: 40 INJECTION, SUSPENSION INTRA-ARTICULAR; INTRAMUSCULAR at 09:46:00

## 2021-03-19 NOTE — H&P
[facility] New Patient Note    CC: Right wrist pain    HPI: This 41year old right-hand-dominant male presents with right wrist pain that started about 3 to 4 months ago.   He really began to notice it when he started batting practice for his kids bas polyphagia and polyuria. Genitourinary: Negative for dysuria, frequency and urgency. Musculoskeletal: Negative for myalgias, neck pain and neck stiffness. Skin: Negative for color change, rash and wound.    Allergic/Immunologic: Negative for immunocom wrist pain likely due to degenerative tear of the TFCC. He has no discrete injury history.   Given that he has tried activity modification and rest and no significant improvement I think a corticosteroid injection to the ulnocarpal joint is a reasonable op

## 2021-03-19 NOTE — PROCEDURES
Written consent was obtained. Skin was prepped with ChloraPrep.  2 mL mixture of 1 mL of 40 mg of Kenalog and 1 mL of 1% lidocaine was injected into the right ulnocarpal joint. Patient tolerated the procedure. No complications were encountered.   Band-

## 2021-04-09 DIAGNOSIS — Z23 NEED FOR VACCINATION: ICD-10-CM

## 2021-04-19 ENCOUNTER — OFFICE VISIT (OUTPATIENT)
Dept: ORTHOPEDICS CLINIC | Facility: CLINIC | Age: 41
End: 2021-04-19
Payer: COMMERCIAL

## 2021-04-19 VITALS — OXYGEN SATURATION: 100 % | HEART RATE: 82 BPM

## 2021-04-19 DIAGNOSIS — S69.81XA INJURY OF TRIANGULAR FIBROCARTILAGE COMPLEX (TFCC) OF RIGHT WRIST, INITIAL ENCOUNTER: Primary | ICD-10-CM

## 2021-04-19 PROCEDURE — 99212 OFFICE O/P EST SF 10 MIN: CPT | Performed by: ORTHOPAEDIC SURGERY

## 2021-04-19 NOTE — PROGRESS NOTES
[facility] New Patient Note    CC: Right wrist pain    HPI: This 41year old right-hand-dominant male presents with right wrist pain that started about 3 to 4 months ago.   He really began to notice it when he started batting practice for his kids bas wheezing. Cardiovascular: Negative for chest pain, palpitations and leg swelling. Gastrointestinal: Negative for abdominal pain, nausea and vomiting. Endocrine: Negative for polydipsia, polyphagia and polyuria.    Genitourinary: Negative for dysuria, osseous abnormalities. Normal mechanical alignment of the wrist.    Assessment/Plan:  41year old with right ulnar-sided wrist pain likely due to degenerative tear of the TFCC.   Patient has responded well with a corticosteroid injection with 90% improveme

## 2021-07-20 DIAGNOSIS — I10 ESSENTIAL HYPERTENSION: Chronic | ICD-10-CM

## 2021-07-20 RX ORDER — TRIAMTERENE AND HYDROCHLOROTHIAZIDE 37.5; 25 MG/1; MG/1
CAPSULE ORAL
Qty: 90 CAPSULE | Refills: 1 | Status: SHIPPED | OUTPATIENT
Start: 2021-07-20 | End: 2021-10-18 | Stop reason: ALTCHOICE

## 2021-10-11 ENCOUNTER — OFFICE VISIT (OUTPATIENT)
Dept: INTERNAL MEDICINE CLINIC | Facility: CLINIC | Age: 41
End: 2021-10-11
Payer: COMMERCIAL

## 2021-10-11 VITALS
HEIGHT: 71 IN | BODY MASS INDEX: 37.52 KG/M2 | DIASTOLIC BLOOD PRESSURE: 84 MMHG | TEMPERATURE: 98 F | OXYGEN SATURATION: 98 % | HEART RATE: 84 BPM | RESPIRATION RATE: 16 BRPM | WEIGHT: 268 LBS | SYSTOLIC BLOOD PRESSURE: 130 MMHG

## 2021-10-11 DIAGNOSIS — M79.674 GREAT TOE PAIN, RIGHT: Primary | ICD-10-CM

## 2021-10-11 DIAGNOSIS — I10 ESSENTIAL HYPERTENSION: ICD-10-CM

## 2021-10-11 DIAGNOSIS — F41.9 ANXIETY: ICD-10-CM

## 2021-10-11 DIAGNOSIS — E55.9 VITAMIN D INSUFFICIENCY: ICD-10-CM

## 2021-10-11 DIAGNOSIS — E66.09 CLASS 2 OBESITY DUE TO EXCESS CALORIES WITHOUT SERIOUS COMORBIDITY WITH BODY MASS INDEX (BMI) OF 37.0 TO 37.9 IN ADULT: ICD-10-CM

## 2021-10-11 PROCEDURE — 90686 IIV4 VACC NO PRSV 0.5 ML IM: CPT | Performed by: FAMILY MEDICINE

## 2021-10-11 PROCEDURE — 3008F BODY MASS INDEX DOCD: CPT | Performed by: FAMILY MEDICINE

## 2021-10-11 PROCEDURE — 3079F DIAST BP 80-89 MM HG: CPT | Performed by: FAMILY MEDICINE

## 2021-10-11 PROCEDURE — 99203 OFFICE O/P NEW LOW 30 MIN: CPT | Performed by: FAMILY MEDICINE

## 2021-10-11 PROCEDURE — 90471 IMMUNIZATION ADMIN: CPT | Performed by: FAMILY MEDICINE

## 2021-10-11 PROCEDURE — 3075F SYST BP GE 130 - 139MM HG: CPT | Performed by: FAMILY MEDICINE

## 2021-10-11 RX ORDER — MULTIVIT-MIN/IRON/FOLIC ACID/K 18-600-40
1 CAPSULE ORAL DAILY
COMMUNITY

## 2021-10-11 NOTE — PROGRESS NOTES
1 [facility] Keesha  8/2/1980    Patient presents with:  Establish Care: RM 8 JY  Pain: right toe hurts at the joint, aggravted by excessive walking      HPI:   1 [facility] Keesha is a 35year old male who presents to establish care.  He had a PCP in the past, treated wit exertion  CARDIOVASCULAR: denies chest pain on exertion  GI: no nausea or abdominal pain  NEURO: denies headaches    EXAM:   /84 (BP Location: Right arm, Patient Position: Sitting, Cuff Size: adult)   Pulse 84   Temp 97.7 °F (36.5 °C)   Resp 16   Ht

## 2021-10-13 ENCOUNTER — HOSPITAL ENCOUNTER (OUTPATIENT)
Dept: GENERAL RADIOLOGY | Age: 41
Discharge: HOME | End: 2021-10-13
Attending: FAMILY MEDICINE
Payer: COMMERCIAL

## 2021-10-13 ENCOUNTER — LAB ENCOUNTER (OUTPATIENT)
Dept: LAB | Age: 41
End: 2021-10-13
Attending: FAMILY MEDICINE
Payer: COMMERCIAL

## 2021-10-13 DIAGNOSIS — M79.674 GREAT TOE PAIN, RIGHT: ICD-10-CM

## 2021-10-13 DIAGNOSIS — E55.9 VITAMIN D INSUFFICIENCY: ICD-10-CM

## 2021-10-13 DIAGNOSIS — I10 ESSENTIAL HYPERTENSION: ICD-10-CM

## 2021-10-13 PROCEDURE — 73620 X-RAY EXAM OF FOOT: CPT | Performed by: FAMILY MEDICINE

## 2021-10-15 ENCOUNTER — LABORATORY ENCOUNTER (OUTPATIENT)
Dept: LAB | Age: 41
End: 2021-10-15
Attending: FAMILY MEDICINE
Payer: COMMERCIAL

## 2021-10-15 PROCEDURE — 80048 BASIC METABOLIC PNL TOTAL CA: CPT

## 2021-10-15 PROCEDURE — 82306 VITAMIN D 25 HYDROXY: CPT

## 2021-10-15 PROCEDURE — 36415 COLL VENOUS BLD VENIPUNCTURE: CPT

## 2021-10-18 DIAGNOSIS — E87.6 HYPOKALEMIA: Primary | ICD-10-CM

## 2021-10-18 DIAGNOSIS — I10 PRIMARY HYPERTENSION: ICD-10-CM

## 2021-10-18 RX ORDER — LISINOPRIL AND HYDROCHLOROTHIAZIDE 25; 20 MG/1; MG/1
1 TABLET ORAL DAILY
Qty: 90 TABLET | Refills: 0 | Status: SHIPPED | OUTPATIENT
Start: 2021-10-18 | End: 2021-11-03 | Stop reason: ALTCHOICE

## 2021-10-18 NOTE — PROGRESS NOTES
Discussed with patient. Hypokalemia likely 2/2 current BP medication. Switch to lisinopril-hydrochlorothiazide, recheck BMP in 2 weeks and office f/u for BP in 2 weeks. Please call to schedule appt.

## 2021-10-25 ENCOUNTER — TELEPHONE (OUTPATIENT)
Dept: ORTHOPEDICS CLINIC | Facility: CLINIC | Age: 41
End: 2021-10-25

## 2021-10-25 NOTE — TELEPHONE ENCOUNTER
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  Loss of the plantar arch.          Xrays completed on 10/13/21 with the above findings. No new imaging needed at this time.

## 2021-10-25 NOTE — TELEPHONE ENCOUNTER
Patient coming in for Right great big toe pain. Patient had imaging done, can be viewed in Epic. Please place Rx if further imaging needed. Thank you.    Future Appointments   Date Time Provider Linda [facility]   11/1/2021 11:40 AM Julianne Smith MD

## 2021-11-01 ENCOUNTER — OFFICE VISIT (OUTPATIENT)
Dept: INTERNAL MEDICINE CLINIC | Facility: CLINIC | Age: 41
End: 2021-11-01
Payer: COMMERCIAL

## 2021-11-01 ENCOUNTER — LAB ENCOUNTER (OUTPATIENT)
Dept: LAB | Age: 41
End: 2021-11-01
Attending: FAMILY MEDICINE
Payer: COMMERCIAL

## 2021-11-01 VITALS
TEMPERATURE: 97 F | BODY MASS INDEX: 37.43 KG/M2 | WEIGHT: 267.38 LBS | DIASTOLIC BLOOD PRESSURE: 78 MMHG | OXYGEN SATURATION: 97 % | RESPIRATION RATE: 16 BRPM | HEIGHT: 71 IN | SYSTOLIC BLOOD PRESSURE: 132 MMHG | HEART RATE: 86 BPM

## 2021-11-01 DIAGNOSIS — I10 PRIMARY HYPERTENSION: ICD-10-CM

## 2021-11-01 DIAGNOSIS — I10 ESSENTIAL HYPERTENSION: Primary | ICD-10-CM

## 2021-11-01 DIAGNOSIS — E87.6 HYPOKALEMIA: ICD-10-CM

## 2021-11-01 PROCEDURE — 99213 OFFICE O/P EST LOW 20 MIN: CPT | Performed by: FAMILY MEDICINE

## 2021-11-01 PROCEDURE — 80048 BASIC METABOLIC PNL TOTAL CA: CPT

## 2021-11-01 PROCEDURE — 36415 COLL VENOUS BLD VENIPUNCTURE: CPT

## 2021-11-01 PROCEDURE — 3075F SYST BP GE 130 - 139MM HG: CPT | Performed by: FAMILY MEDICINE

## 2021-11-01 PROCEDURE — 3008F BODY MASS INDEX DOCD: CPT | Performed by: FAMILY MEDICINE

## 2021-11-01 PROCEDURE — 3078F DIAST BP <80 MM HG: CPT | Performed by: FAMILY MEDICINE

## 2021-11-01 NOTE — PROGRESS NOTES
Bridgette Mendez  8/2/1980    Patient presents with: Follow - Up: RM 9 JY, BP f/u, med review      HPI:   Bridgette Mendez is a 35year old male who presents for follow-up on his BP. He has been checking his BP intermittently and has stayed in the 130/80's.  Has not kg/m²   GENERAL: Well developed, well nourished  SKIN: No rashes,no suspicious lesions  EYES: conjunctiva are clear  HEENT: atraumatic, normocephalic  NECK: supple  LUNGS: clear to auscultation  CARDIO: RRR without murmur      ASSESSMENT AND PLAN:   Le HERNANDEZ

## 2021-11-03 DIAGNOSIS — E87.6 HYPOKALEMIA: ICD-10-CM

## 2021-11-03 DIAGNOSIS — I10 ESSENTIAL HYPERTENSION: Primary | ICD-10-CM

## 2021-11-03 RX ORDER — LISINOPRIL 40 MG/1
40 TABLET ORAL DAILY
Qty: 30 TABLET | Refills: 0 | Status: SHIPPED | OUTPATIENT
Start: 2021-11-03 | End: 2021-12-01

## 2021-11-04 ENCOUNTER — OFFICE VISIT (OUTPATIENT)
Dept: ORTHOPEDICS CLINIC | Facility: CLINIC | Age: 41
End: 2021-11-04
Payer: COMMERCIAL

## 2021-11-04 VITALS — BODY MASS INDEX: 37.38 KG/M2 | WEIGHT: 267 LBS | HEIGHT: 71 IN

## 2021-11-04 DIAGNOSIS — M21.40 PES PLANUS, UNSPECIFIED LATERALITY: ICD-10-CM

## 2021-11-04 DIAGNOSIS — M20.5X1 HALLUX LIMITUS OF RIGHT FOOT: Primary | ICD-10-CM

## 2021-11-04 PROCEDURE — 99204 OFFICE O/P NEW MOD 45 MIN: CPT | Performed by: PODIATRIST

## 2021-11-04 PROCEDURE — 20605 DRAIN/INJ JOINT/BURSA W/O US: CPT | Performed by: PODIATRIST

## 2021-11-04 PROCEDURE — 3008F BODY MASS INDEX DOCD: CPT | Performed by: PODIATRIST

## 2021-11-04 RX ORDER — BETAMETHASONE SODIUM PHOSPHATE AND BETAMETHASONE ACETATE 3; 3 MG/ML; MG/ML
12 INJECTION, SUSPENSION INTRA-ARTICULAR; INTRALESIONAL; INTRAMUSCULAR; SOFT TISSUE ONCE
Status: COMPLETED | OUTPATIENT
Start: 2021-11-04 | End: 2021-11-04

## 2021-11-04 RX ADMIN — BETAMETHASONE SODIUM PHOSPHATE AND BETAMETHASONE ACETATE 12 MG: 3; 3 INJECTION, SUSPENSION INTRA-ARTICULAR; INTRALESIONAL; INTRAMUSCULAR; SOFT TISSUE at 13:52:00

## 2021-11-04 NOTE — PROGRESS NOTES
[facility] New Patient Note    CC: Patient presents with: Foot Pain: Pt is here for right great toe pain.  Has been going on for a while but increased within the last few months      HPI: The patient is a 35year old male who presents today wit kg/m²   Exam bilateral feet flexible flatfoot.   Right foot fairly good range of motion nontender to range of motion of the first MP joint except for end range dorsiflexion and plantarflexion most of the discomfort is over the dorsum of the joint there is p Medical voice recognition software.

## 2021-11-09 ENCOUNTER — PATIENT MESSAGE (OUTPATIENT)
Dept: INTERNAL MEDICINE CLINIC | Facility: CLINIC | Age: 41
End: 2021-11-09

## 2021-11-10 NOTE — TELEPHONE ENCOUNTER
Discussed with patient. Will begin consistent measuring the average of 3 readings. Record for one week and provided update.

## 2021-11-10 NOTE — TELEPHONE ENCOUNTER
From: [facility]  To: Shubham Huff MD  Sent: 11/9/2021 9:40 PM CST  Subject: Blood Pressure    Dr. Rita Jefferson - According to my blood pressure machine it seems that my BP has increased a bit since I switched meds.  Is this normal with the med switch or is [place]

## 2021-11-29 ENCOUNTER — LAB ENCOUNTER (OUTPATIENT)
Dept: LAB | Age: 41
End: 2021-11-29
Attending: FAMILY MEDICINE
Payer: COMMERCIAL

## 2021-11-29 DIAGNOSIS — I10 ESSENTIAL HYPERTENSION: ICD-10-CM

## 2021-11-29 DIAGNOSIS — E87.6 HYPOKALEMIA: ICD-10-CM

## 2021-11-29 PROCEDURE — 36415 COLL VENOUS BLD VENIPUNCTURE: CPT

## 2021-11-29 PROCEDURE — 80048 BASIC METABOLIC PNL TOTAL CA: CPT

## 2021-11-30 ENCOUNTER — OFFICE VISIT (OUTPATIENT)
Dept: INTERNAL MEDICINE CLINIC | Facility: CLINIC | Age: 41
End: 2021-11-30
Payer: COMMERCIAL

## 2021-11-30 VITALS
WEIGHT: 255.81 LBS | HEART RATE: 78 BPM | DIASTOLIC BLOOD PRESSURE: 80 MMHG | HEIGHT: 71 IN | RESPIRATION RATE: 16 BRPM | SYSTOLIC BLOOD PRESSURE: 132 MMHG | BODY MASS INDEX: 35.81 KG/M2 | TEMPERATURE: 97 F | OXYGEN SATURATION: 98 %

## 2021-11-30 DIAGNOSIS — F41.9 ANXIETY: ICD-10-CM

## 2021-11-30 DIAGNOSIS — E66.9 OBESITY (BMI 30-39.9): ICD-10-CM

## 2021-11-30 DIAGNOSIS — I10 ESSENTIAL HYPERTENSION: ICD-10-CM

## 2021-11-30 DIAGNOSIS — I10 ESSENTIAL HYPERTENSION: Primary | ICD-10-CM

## 2021-11-30 DIAGNOSIS — E87.6 HYPOKALEMIA: ICD-10-CM

## 2021-11-30 PROCEDURE — 3008F BODY MASS INDEX DOCD: CPT | Performed by: FAMILY MEDICINE

## 2021-11-30 PROCEDURE — 3075F SYST BP GE 130 - 139MM HG: CPT | Performed by: FAMILY MEDICINE

## 2021-11-30 PROCEDURE — 99214 OFFICE O/P EST MOD 30 MIN: CPT | Performed by: FAMILY MEDICINE

## 2021-11-30 PROCEDURE — 3079F DIAST BP 80-89 MM HG: CPT | Performed by: FAMILY MEDICINE

## 2021-11-30 NOTE — PROGRESS NOTES
Raheel Scott  8/2/1980    Patient presents with: Follow - Up: RM 9 JY, BP f/u      HPI:   Raheel Scott is a 35year old male who presents for follow-up on his HTN. He has been working on weight loss with diet and exercise.  Has been monitoring BP at home wit nourished  SKIN: No rashes,no suspicious lesions  EYES: conjunctiva are clear  HEENT: atraumatic, normocephalic  NECK: supple  LUNGS: normal work of breathing.   CARDIO: RRR  GI: soft, NT    ASSESSMENT AND PLAN:   Rose Villar is a 35year old male who prese

## 2021-12-01 RX ORDER — LISINOPRIL 40 MG/1
TABLET ORAL
Qty: 90 TABLET | Refills: 0 | Status: SHIPPED | OUTPATIENT
Start: 2021-12-01

## 2021-12-01 NOTE — TELEPHONE ENCOUNTER
PASSED per protocol, refill pending due to quantity.  Updated to #90   Last PE No recent PE in file   Future Appointments   Date Time Provider [facility]   3/1/2022 11:40 AM Sophy Del Rosario MD [facility]   11/2/2022  1:00 PM Di Cedillo

## 2022-02-04 ENCOUNTER — OFFICE VISIT (OUTPATIENT)
Dept: ORTHOPEDICS CLINIC | Facility: CLINIC | Age: 42
End: 2022-02-04
Payer: COMMERCIAL

## 2022-02-04 DIAGNOSIS — M20.5X1 HALLUX LIMITUS OF RIGHT FOOT: Primary | ICD-10-CM

## 2022-02-04 PROCEDURE — 99214 OFFICE O/P EST MOD 30 MIN: CPT | Performed by: PODIATRIST

## 2022-02-04 NOTE — PROGRESS NOTES
[facility] Progress Note    Subjective:     Patient is here for follow-up of hallux limitus condition right foot. The injection several weeks ago was somewhat helpful for about 1 week but then the pain has returned. He actually feels like it is getting a little worse. He does wear his [place] shoes which helped quite a bit but  any increased activity starts to get pain with the joint        Objective:     Exam right foot enlarged first MP joint with good range of motion but at the end range dorsiflexion he has pain across the dorsum of the joint palpable dorsal osteophytes noted. No plantar pain no pain about the sesamoids. Minimal to no swelling      Review of the x-rays show large dorsal spur first metatarsal head with adaptive changes to the proximal phalanx and narrowing of the first MP joint            Assessment/Plan:     Diagnoses and all orders for this visit:    Hallux limitus of right foot        He would like to proceed with cheilectomy at this point and we discussed the procedure, the postop course and any potential complications including infection, continued pain among others. This would be done under IV sedation with local at [facility] and we talked about using a biologic as well in the joint. Postoperatively he would be getting the joint moving quite a bit right away and we will look into the splint as well for CPM motion. Long-term he may need fusion of the joint but it is much too early for that and we talked about the fact that this can give him some years of less pain and hopefully a little more motion. Eunice Johnson. Mariah Dalton DPM  [facility]    [employer] speech recognition software was used to prepare this note. If a word or phrase is confusing, it is likely do to a failure of recognition. Please contact me with any questions or clarifications.

## 2022-02-28 ENCOUNTER — PATIENT MESSAGE (OUTPATIENT)
Dept: INTERNAL MEDICINE CLINIC | Facility: CLINIC | Age: 42
End: 2022-02-28

## 2022-02-28 RX ORDER — LISINOPRIL 40 MG/1
TABLET ORAL
Qty: 90 TABLET | Refills: 0 | Status: SHIPPED | OUTPATIENT
Start: 2022-02-28

## 2022-02-28 NOTE — TELEPHONE ENCOUNTER
PASSED per protocol, refill pending, due for HTN f/up.   Last PE 3.8.21  Future Appointments   Date Time Provider [facility]   3/1/2022 11:40 AM Bhanu Brito MD [facility]   11/2/2022  1:00 PM Timmothy Barefoot., DPM [facility] [employer]

## 2022-02-28 NOTE — TELEPHONE ENCOUNTER
Last BMP 11/29/21. F/U appt is scheduled for 3/1/22 with [address].    [address] Bowman, do you want any labs prior to pt's appt 3/1/22?

## 2022-02-28 NOTE — TELEPHONE ENCOUNTER
From: [facility]  To: Tammy Saavedra MD  Sent: 2/28/2022 10:08 AM CST  Subject: Blood test    Am I supposed to get blood work/test done prior to our appointment tomorrow? Thanks.

## 2022-03-01 ENCOUNTER — OFFICE VISIT (OUTPATIENT)
Dept: INTERNAL MEDICINE CLINIC | Facility: CLINIC | Age: 42
End: 2022-03-01
Payer: COMMERCIAL

## 2022-03-01 VITALS
WEIGHT: 256.63 LBS | DIASTOLIC BLOOD PRESSURE: 90 MMHG | TEMPERATURE: 99 F | HEART RATE: 86 BPM | RESPIRATION RATE: 16 BRPM | HEIGHT: 71 IN | BODY MASS INDEX: 35.93 KG/M2 | SYSTOLIC BLOOD PRESSURE: 136 MMHG

## 2022-03-01 DIAGNOSIS — M25.551 RIGHT HIP PAIN: ICD-10-CM

## 2022-03-01 DIAGNOSIS — I10 ESSENTIAL HYPERTENSION: Primary | ICD-10-CM

## 2022-03-01 DIAGNOSIS — G44.209 TENSION-TYPE HEADACHE, NOT INTRACTABLE, UNSPECIFIED CHRONICITY PATTERN: ICD-10-CM

## 2022-03-01 PROCEDURE — 3080F DIAST BP >= 90 MM HG: CPT | Performed by: FAMILY MEDICINE

## 2022-03-01 PROCEDURE — 99214 OFFICE O/P EST MOD 30 MIN: CPT | Performed by: FAMILY MEDICINE

## 2022-03-01 PROCEDURE — 3075F SYST BP GE 130 - 139MM HG: CPT | Performed by: FAMILY MEDICINE

## 2022-03-01 PROCEDURE — 3008F BODY MASS INDEX DOCD: CPT | Performed by: FAMILY MEDICINE

## 2022-03-01 RX ORDER — AMLODIPINE BESYLATE 5 MG/1
5 TABLET ORAL DAILY
Qty: 90 TABLET | Refills: 0 | Status: SHIPPED | OUTPATIENT
Start: 2022-03-01

## 2022-03-18 ENCOUNTER — TELEPHONE (OUTPATIENT)
Dept: ORTHOPEDICS CLINIC | Facility: CLINIC | Age: 42
End: 2022-03-18

## 2022-03-25 ENCOUNTER — TELEPHONE (OUTPATIENT)
Dept: INTERNAL MEDICINE CLINIC | Facility: CLINIC | Age: 42
End: 2022-03-25

## 2022-03-25 NOTE — TELEPHONE ENCOUNTER
Pt scheduled for pre-op with [facility] on below. Pt having surgery on his big toe with Dr. Latasha Pandey on 4/11. Future Appointments   Date Time Provider Linda Yeboah   4/1/2022  1:40 PM Lois Montoya MD [facility]     No pw in chart. Faxing our pw to [phone]. Their phone number is [phone]. Placing pw in brown folder.

## 2022-03-25 NOTE — TELEPHONE ENCOUNTER
Pt is coming in for pre-op on 4/1 - pt wants to know if same labs can be used. Orders to Jessie Hernandez Pt aware to get labs done no sooner than 2 weeks prior to the appt. Pt aware to fast.  No call back required.     Future Appointments   Date Time Provider Linda [facility]   5/6/2022 10:40 AM Aura Parr MD [facility]

## 2022-03-28 NOTE — TELEPHONE ENCOUNTER
Surgeon: Dr. Wilder Underwood    Date of Surgery: 4/11/2022       Post Op Appt: 4/12/2022 2:15pm    Facility: [facility]    Inpatient or Outpatient: Outpatient    Surgical Assistant: n/a    Preadmission Testing Ordered:  yes: EKG CMP    Pre-Op Clearance Requested:   PCP: yes: Dr. Eris Talley   Cardiac: no   Pulmonary: no   Dental: no   Other: no    DME: no    Rehab Services Ordered:   Home Health: no   Outpatient: no    Is this work comp related? no    Prior Authorization: pending    Disability Paperwork: no    On [place] Calendar: yes    Notes:

## 2022-04-01 ENCOUNTER — TELEPHONE (OUTPATIENT)
Dept: ORTHOPEDICS CLINIC | Facility: CLINIC | Age: 42
End: 2022-04-01

## 2022-04-01 ENCOUNTER — OFFICE VISIT (OUTPATIENT)
Dept: INTERNAL MEDICINE CLINIC | Facility: CLINIC | Age: 42
End: 2022-04-01
Payer: COMMERCIAL

## 2022-04-01 VITALS
SYSTOLIC BLOOD PRESSURE: 118 MMHG | HEIGHT: 71 IN | BODY MASS INDEX: 36.26 KG/M2 | WEIGHT: 259 LBS | RESPIRATION RATE: 18 BRPM | HEART RATE: 85 BPM | DIASTOLIC BLOOD PRESSURE: 84 MMHG

## 2022-04-01 DIAGNOSIS — M79.674 GREAT TOE PAIN, RIGHT: ICD-10-CM

## 2022-04-01 DIAGNOSIS — I10 ESSENTIAL HYPERTENSION: ICD-10-CM

## 2022-04-01 DIAGNOSIS — Z01.818 PREOPERATIVE EXAMINATION: Primary | ICD-10-CM

## 2022-04-01 DIAGNOSIS — M20.5X1 HALLUX LIMITUS OF RIGHT FOOT: ICD-10-CM

## 2022-04-01 PROCEDURE — 3074F SYST BP LT 130 MM HG: CPT | Performed by: FAMILY MEDICINE

## 2022-04-01 PROCEDURE — 3079F DIAST BP 80-89 MM HG: CPT | Performed by: FAMILY MEDICINE

## 2022-04-01 PROCEDURE — 99214 OFFICE O/P EST MOD 30 MIN: CPT | Performed by: FAMILY MEDICINE

## 2022-04-01 PROCEDURE — 3008F BODY MASS INDEX DOCD: CPT | Performed by: FAMILY MEDICINE

## 2022-04-01 NOTE — TELEPHONE ENCOUNTER
Received approval for both provider and [facility] facility - patient does not want to pay a his higher deductible.  Please cancel surgery and call patient to discuss if and when Dr. Tima Alvarado goes to [place], if that can be a possibility

## 2022-04-01 NOTE — TELEPHONE ENCOUNTER
Patient calling stating he got a message from his insurance stating his surgery facility is out of network.  Please advise

## 2022-04-04 NOTE — TELEPHONE ENCOUNTER
CX patients surgery.  I let him know that I will contact him once I have a start date for [facility]

## 2022-04-05 ENCOUNTER — PATIENT MESSAGE (OUTPATIENT)
Dept: ORTHOPEDICS CLINIC | Facility: CLINIC | Age: 42
End: 2022-04-05

## 2022-04-05 NOTE — TELEPHONE ENCOUNTER
From: [facility]  To: Avril Martin. Hellen Wilson DPM  Sent: 4/5/2022 12:04 AM CDT  Subject: Outpatient List    As discussed over the phone, I attached 2 lists of in-network surgery facilities that were given to me by my medical insurance company. If the location lists [facility] Only\" or does not appear on this list then my insurance will cover far less with a much higher deductible.     Thanks,   [place]

## 2022-04-08 ENCOUNTER — TELEPHONE (OUTPATIENT)
Dept: ORTHOPEDICS CLINIC | Facility: CLINIC | Age: 42
End: 2022-04-08

## 2022-04-08 NOTE — TELEPHONE ENCOUNTER
Surgeon: Dr. Ha Weathers    Date of Surgery: 4/25/2022       Post Op Appt: 4/26/2022 2:00pm    Facility: [address] [place] [address]    Inpatient or Outpatient: Outpatient    Surgical Assistant: n/a    Preadmission Testing Ordered:  yes    Pre-Op Clearance Requested:   PCP: yes   Cardiac: no   Pulmonary: no   Dental: no   Other: no    DME: no    Rehab Services Ordered:   Home Health: no   Outpatient: no    Is this work comp related? no    Prior Authorization: pending    Disability Paperwork: no    On [facility] Calendar: yes    Notes:

## 2022-04-22 ENCOUNTER — LAB REQUISITION (OUTPATIENT)
Dept: SURGERY | Age: 42
End: 2022-04-22
Payer: COMMERCIAL

## 2022-04-23 LAB — SARS-COV-2 RNA RESP QL NAA+PROBE: NOT DETECTED

## 2022-04-25 ENCOUNTER — LAB REQUISITION (OUTPATIENT)
Dept: SURGERY | Age: 42
End: 2022-04-25
Payer: COMMERCIAL

## 2022-04-25 ENCOUNTER — TELEPHONE (OUTPATIENT)
Dept: ORTHOPEDICS CLINIC | Facility: CLINIC | Age: 42
End: 2022-04-25

## 2022-04-25 PROCEDURE — 88304 TISSUE EXAM BY PATHOLOGIST: CPT | Performed by: PODIATRIST

## 2022-04-25 PROCEDURE — 88305 TISSUE EXAM BY PATHOLOGIST: CPT | Performed by: PODIATRIST

## 2022-04-25 PROCEDURE — 88311 DECALCIFY TISSUE: CPT | Performed by: PODIATRIST

## 2022-04-25 RX ORDER — HYDROCODONE BITARTRATE AND ACETAMINOPHEN 5; 325 MG/1; MG/1
1-2 TABLET ORAL EVERY 6 HOURS PRN
Qty: 30 TABLET | Refills: 0 | Status: SHIPPED | OUTPATIENT
Start: 2022-04-25

## 2022-04-25 NOTE — TELEPHONE ENCOUNTER
Patients wife calling requesting norco medication be sent to pharmacy. Please advise Patients wife stated he just had surgery.

## 2022-04-25 NOTE — TELEPHONE ENCOUNTER
Rx sent by Dr Ana Thomas Medication Detail   Disp Refills Start End    HYDROcodone-acetaminophen [facility]) 5-325 MG Oral Tab 30 tablet 0 4/25/2022     Sig - Route:  Take 1-2 tablets by mouth every 6 (six) hours as needed for Pain. - Oral    Sent to pharmacy as: HYDROcodone-Acetaminophen 5-325 MG Oral Tablet    Earliest Fill Date: 4/25/2022    E-Prescribing Status: Receipt confirmed by pharmacy (4/25/2022 11:21 AM CDT)

## 2022-04-26 ENCOUNTER — HOSPITAL ENCOUNTER (OUTPATIENT)
Dept: GENERAL RADIOLOGY | Age: 42
Discharge: HOME | End: 2022-04-26
Attending: PODIATRIST
Payer: COMMERCIAL

## 2022-04-26 ENCOUNTER — HOSPITAL ENCOUNTER (OUTPATIENT)
Dept: GENERAL RADIOLOGY | Age: 42
Discharge: HOME | End: 2022-04-26
Attending: FAMILY MEDICINE
Payer: COMMERCIAL

## 2022-04-26 ENCOUNTER — OFFICE VISIT (OUTPATIENT)
Dept: ORTHOPEDICS CLINIC | Facility: CLINIC | Age: 42
End: 2022-04-26
Payer: COMMERCIAL

## 2022-04-26 DIAGNOSIS — M25.551 RIGHT HIP PAIN: ICD-10-CM

## 2022-04-26 DIAGNOSIS — M20.5X1 HALLUX LIMITUS OF RIGHT FOOT: ICD-10-CM

## 2022-04-26 DIAGNOSIS — M20.5X1 HALLUX LIMITUS OF RIGHT FOOT: Primary | ICD-10-CM

## 2022-04-26 PROCEDURE — 99024 POSTOP FOLLOW-UP VISIT: CPT | Performed by: PODIATRIST

## 2022-04-26 PROCEDURE — 73630 X-RAY EXAM OF FOOT: CPT | Performed by: PODIATRIST

## 2022-04-26 PROCEDURE — 73502 X-RAY EXAM HIP UNI 2-3 VIEWS: CPT | Performed by: FAMILY MEDICINE

## 2022-04-26 NOTE — PROGRESS NOTES
[facility] Progress Note    Subjective:     Patient returns for follow-up, he is 1 day postop right foot first MP joint cheilectomy. He had sort of a hard night and difficulty sleeping. He has been walking around at home without the boot to encourage motion as recommended but it is a little uncomfortable. Objective:     Exam right foot bandage removed moderate amount of bleeding on the bandage. Moderate amount of swelling and ecchymosis about the forefoot first MP joint is stiff, painful motion  Incision is well coapted          X-rays show absence of the exostosis dorsal first metatarsal      Assessment/Plan:     Diagnoses and all orders for this visit:    Hallux limitus of right foot  -     XR FOOT, COMPLETE (MIN 3 VIEWS), RIGHT (CPT=73630); Future  -     DME - EXTERNAL         Bandage reapplied. We are trying to get a splint for continuous passive range of motion and he was fitted for this today. This would be to use several times a day at home. The foot was redressed and I encouraged him to start doing his range of motion exercises. He was instructed in this. When he is weightbearing he can go without the boot around the home to encourage motion. A lot of elevation ice and rest.  Follow-up is in 2 weeks            Ulysses Sea. Salomon Paulson DPM  [facility]    [employer] speech recognition software was used to prepare this note. If a word or phrase is confusing, it is likely do to a failure of recognition. Please contact me with any questions or clarifications.

## 2022-05-02 ENCOUNTER — LAB ENCOUNTER (OUTPATIENT)
Dept: LAB | Age: 42
End: 2022-05-02
Attending: FAMILY MEDICINE
Payer: COMMERCIAL

## 2022-05-02 DIAGNOSIS — Z13.228 ENCOUNTER FOR SCREENING FOR METABOLIC DISORDER: ICD-10-CM

## 2022-05-02 DIAGNOSIS — Z13.220 SCREENING FOR LIPID DISORDERS: ICD-10-CM

## 2022-05-02 DIAGNOSIS — Z13.0 SCREENING FOR DISORDER OF BLOOD AND BLOOD-FORMING ORGANS: ICD-10-CM

## 2022-05-02 DIAGNOSIS — Z13.29 SCREENING FOR THYROID DISORDER: ICD-10-CM

## 2022-05-02 LAB
ALBUMIN SERPL-MCNC: 4 G/DL (ref 3.4–5)
ALBUMIN/GLOB SERPL: 1.3 {RATIO} (ref 1–2)
ALP LIVER SERPL-CCNC: 51 U/L
ALT SERPL-CCNC: 46 U/L
ANION GAP SERPL CALC-SCNC: 6 MMOL/L (ref 0–18)
AST SERPL-CCNC: 26 U/L (ref 15–37)
BASOPHILS # BLD AUTO: 0.04 X10(3) UL (ref 0–0.2)
BASOPHILS NFR BLD AUTO: 0.7 %
BILIRUB SERPL-MCNC: 0.7 MG/DL (ref 0.1–2)
BUN BLD-MCNC: 14 MG/DL (ref 7–18)
CALCIUM BLD-MCNC: 9.1 MG/DL (ref 8.5–10.1)
CHLORIDE SERPL-SCNC: 106 MMOL/L (ref 98–112)
CHOLEST SERPL-MCNC: 152 MG/DL (ref ?–200)
CO2 SERPL-SCNC: 30 MMOL/L (ref 21–32)
CREAT BLD-MCNC: 1.19 MG/DL
EOSINOPHIL # BLD AUTO: 0.09 X10(3) UL (ref 0–0.7)
EOSINOPHIL NFR BLD AUTO: 1.5 %
ERYTHROCYTE [DISTWIDTH] IN BLOOD BY AUTOMATED COUNT: 13.9 %
FASTING PATIENT LIPID ANSWER: YES
FASTING STATUS PATIENT QL REPORTED: YES
GLOBULIN PLAS-MCNC: 3.1 G/DL (ref 2.8–4.4)
GLUCOSE BLD-MCNC: 94 MG/DL (ref 70–99)
HCT VFR BLD AUTO: 44.4 %
HDLC SERPL-MCNC: 51 MG/DL (ref 40–59)
HGB BLD-MCNC: 13.9 G/DL
IMM GRANULOCYTES # BLD AUTO: 0.01 X10(3) UL (ref 0–1)
IMM GRANULOCYTES NFR BLD: 0.2 %
LDLC SERPL CALC-MCNC: 75 MG/DL (ref ?–100)
LYMPHOCYTES # BLD AUTO: 1.64 X10(3) UL (ref 1–4)
LYMPHOCYTES NFR BLD AUTO: 27.1 %
MCH RBC QN AUTO: 25.6 PG (ref 26–34)
MCHC RBC AUTO-ENTMCNC: 31.3 G/DL (ref 31–37)
MCV RBC AUTO: 81.9 FL
MONOCYTES # BLD AUTO: 0.5 X10(3) UL (ref 0.1–1)
MONOCYTES NFR BLD AUTO: 8.3 %
NEUTROPHILS # BLD AUTO: 3.77 X10 (3) UL (ref 1.5–7.7)
NEUTROPHILS # BLD AUTO: 3.77 X10(3) UL (ref 1.5–7.7)
NEUTROPHILS NFR BLD AUTO: 62.2 %
NONHDLC SERPL-MCNC: 101 MG/DL (ref ?–130)
OSMOLALITY SERPL CALC.SUM OF ELEC: 294 MOSM/KG (ref 275–295)
PLATELET # BLD AUTO: 256 10(3)UL (ref 150–450)
POTASSIUM SERPL-SCNC: 3.5 MMOL/L (ref 3.5–5.1)
PROT SERPL-MCNC: 7.1 G/DL (ref 6.4–8.2)
RBC # BLD AUTO: 5.42 X10(6)UL
SODIUM SERPL-SCNC: 142 MMOL/L (ref 136–145)
TRIGL SERPL-MCNC: 154 MG/DL (ref 30–149)
TSI SER-ACNC: 0.86 MIU/ML (ref 0.36–3.74)
VLDLC SERPL CALC-MCNC: 24 MG/DL (ref 0–30)
WBC # BLD AUTO: 6.1 X10(3) UL (ref 4–11)

## 2022-05-02 PROCEDURE — 85025 COMPLETE CBC W/AUTO DIFF WBC: CPT

## 2022-05-02 PROCEDURE — 36415 COLL VENOUS BLD VENIPUNCTURE: CPT

## 2022-05-02 PROCEDURE — 80061 LIPID PANEL: CPT

## 2022-05-02 PROCEDURE — 84443 ASSAY THYROID STIM HORMONE: CPT

## 2022-05-02 PROCEDURE — 80053 COMPREHEN METABOLIC PANEL: CPT

## 2022-05-06 ENCOUNTER — OFFICE VISIT (OUTPATIENT)
Dept: INTERNAL MEDICINE CLINIC | Facility: CLINIC | Age: 42
End: 2022-05-06
Payer: COMMERCIAL

## 2022-05-06 VITALS
RESPIRATION RATE: 18 BRPM | HEIGHT: 71 IN | WEIGHT: 256.19 LBS | BODY MASS INDEX: 35.87 KG/M2 | SYSTOLIC BLOOD PRESSURE: 122 MMHG | DIASTOLIC BLOOD PRESSURE: 78 MMHG | HEART RATE: 87 BPM

## 2022-05-06 DIAGNOSIS — Z00.00 WELLNESS EXAMINATION: Primary | ICD-10-CM

## 2022-05-06 DIAGNOSIS — M20.5X1 HALLUX LIMITUS OF RIGHT FOOT: ICD-10-CM

## 2022-05-06 DIAGNOSIS — E78.1 PURE HYPERTRIGLYCERIDEMIA: ICD-10-CM

## 2022-05-06 DIAGNOSIS — E87.6 HYPOKALEMIA: ICD-10-CM

## 2022-05-06 DIAGNOSIS — M25.551 RIGHT HIP PAIN: ICD-10-CM

## 2022-05-06 DIAGNOSIS — M25.551 GREATER TROCHANTERIC PAIN SYNDROME OF RIGHT LOWER EXTREMITY: ICD-10-CM

## 2022-05-06 DIAGNOSIS — I10 ESSENTIAL HYPERTENSION: ICD-10-CM

## 2022-05-06 PROCEDURE — 3078F DIAST BP <80 MM HG: CPT | Performed by: FAMILY MEDICINE

## 2022-05-06 PROCEDURE — 3008F BODY MASS INDEX DOCD: CPT | Performed by: FAMILY MEDICINE

## 2022-05-06 PROCEDURE — 3074F SYST BP LT 130 MM HG: CPT | Performed by: FAMILY MEDICINE

## 2022-05-06 PROCEDURE — 99396 PREV VISIT EST AGE 40-64: CPT | Performed by: FAMILY MEDICINE

## 2022-05-06 RX ORDER — LISINOPRIL 40 MG/1
40 TABLET ORAL DAILY
Qty: 90 TABLET | Refills: 0 | Status: SHIPPED | OUTPATIENT
Start: 2022-05-06

## 2022-05-06 RX ORDER — AMLODIPINE BESYLATE 5 MG/1
5 TABLET ORAL DAILY
Qty: 90 TABLET | Refills: 0 | Status: CANCELLED | OUTPATIENT
Start: 2022-05-06

## 2022-05-06 RX ORDER — LISINOPRIL 40 MG/1
40 TABLET ORAL DAILY
Qty: 90 TABLET | Refills: 0 | Status: CANCELLED | OUTPATIENT
Start: 2022-05-06

## 2022-05-06 RX ORDER — AMLODIPINE BESYLATE 5 MG/1
5 TABLET ORAL DAILY
Qty: 90 TABLET | Refills: 0 | Status: SHIPPED | OUTPATIENT
Start: 2022-05-06

## 2022-05-10 ENCOUNTER — OFFICE VISIT (OUTPATIENT)
Dept: ORTHOPEDICS CLINIC | Facility: CLINIC | Age: 42
End: 2022-05-10
Payer: COMMERCIAL

## 2022-05-10 VITALS — WEIGHT: 256 LBS | HEIGHT: 71 IN | BODY MASS INDEX: 35.84 KG/M2

## 2022-05-10 DIAGNOSIS — M20.5X1 HALLUX LIMITUS OF RIGHT FOOT: Primary | ICD-10-CM

## 2022-05-10 PROCEDURE — 3008F BODY MASS INDEX DOCD: CPT | Performed by: PODIATRIST

## 2022-05-10 PROCEDURE — 99024 POSTOP FOLLOW-UP VISIT: CPT | Performed by: PODIATRIST

## 2022-05-10 NOTE — PROGRESS NOTES
[facility] Progress Note    Subjective:     Georgia is here 2 weeks postop cheilectomy first MP joint of his right foot. He is doing well. He has not obtained the  range of motion device as of yet. Objective: Incision well coapted, subcuticular sutures are intact. Mild amount of swelling and ecchymosis present. Pretty good range of motion of the first MP joint still limited in the way of plantarflexion right foot                  Assessment/Plan:     Diagnoses and all orders for this visit:    Hallux limitus of right foot  -     OP REFERRAL TO [facility]        Subcuticular sutures released at the proximal and distal end. He can begin bathing. Minimal restrictions at this point because I want him to get moving the joint. Still awaiting cpm machine -    Follow up 1 month visit  Start PT for ROM first mpj if no device yet          Kelvin Chatterjee. Marvin Friend DPM  [facility]    Tw[employer] speech recognition software was used to prepare this note. If a word or phrase is confusing, it is likely do to a failure of recognition. Please contact me with any questions or clarifications.

## 2022-05-16 ENCOUNTER — TELEPHONE (OUTPATIENT)
Dept: PHYSICAL THERAPY | Facility: HOSPITAL | Age: 42
End: 2022-05-16

## 2022-05-17 ENCOUNTER — OFFICE VISIT (OUTPATIENT)
Dept: PHYSICAL THERAPY | Age: 42
End: 2022-05-17
Attending: PODIATRIST
Payer: COMMERCIAL

## 2022-05-17 PROCEDURE — 97161 PT EVAL LOW COMPLEX 20 MIN: CPT

## 2022-05-17 PROCEDURE — 97110 THERAPEUTIC EXERCISES: CPT

## 2022-05-19 ENCOUNTER — OFFICE VISIT (OUTPATIENT)
Dept: PHYSICAL THERAPY | Age: 42
End: 2022-05-19
Attending: PODIATRIST
Payer: COMMERCIAL

## 2022-05-19 PROCEDURE — 97110 THERAPEUTIC EXERCISES: CPT

## 2022-05-19 NOTE — PROGRESS NOTES
Dx: Hallux limitus of right foot (M20.5X1)            Authorized # of Visits:  8  Fall Risk: standard         Precautions: n/a             Subjective: The patient reports he over did it a little yesterday while hitting baseballs in the batting cage and the toe was more sore  Current Pain Ratin/10  Objective:   See flow sheet    Assessment:   The patient tolerated treatment well with no significantly increased complaints of pain    Plan:   Continue PT to address soft tissue and joint restrictions and provide instruction in a progressive therapeutic exercise program with manual and verbal cueing for proper form and technique    Date: 2022  Tx#:  Date: Tx#: 3/ Date: Tx#: 4/ Date: Tx#: 5/ Date: Tx#: 6/ Date: Tx#: 7/ Date: Tx#: 8/   TherEx TherEx TherEx TherEx TherEx TherEx TherEx   Upright bike L5 x 8 min         Prostretch gastroc stretch 3 x 30 sec         Doorway gastroc stretch 3 x 30 sec         Shuttle Bilateral heel raises 4 bands 3 x 15         Sidestep/lunge 2 x 10         Forward step 2 x 10         Seated heel raises with ball between heels         PROM great toe as tolerated         Reviewed HEP         HEP: Seated heel raises, Doorway gastroc stretch, Standing gastroc stretch    Charges:  TherEx x 3       Total Timed Treatment: 40 min  Total Treatment Time: 40 min

## 2022-05-26 ENCOUNTER — OFFICE VISIT (OUTPATIENT)
Dept: PHYSICAL THERAPY | Age: 42
End: 2022-05-26
Attending: PODIATRIST
Payer: COMMERCIAL

## 2022-05-26 DIAGNOSIS — M20.5X1 HALLUX LIMITUS OF RIGHT FOOT: ICD-10-CM

## 2022-05-26 PROCEDURE — 97110 THERAPEUTIC EXERCISES: CPT

## 2022-05-26 NOTE — PROGRESS NOTES
Dx: Hallux limitus of right foot (M20.5X1)            Authorized # of Visits:  8  Fall Risk: standard         Precautions: n/a             Subjective: The patient reports his toe is a little sore today after stretching it more yesterday, but overall he's been feeling better  Current Pain Ratin/10  Objective:   See flow sheet    Assessment:   The patient tolerated treatment well with improving great toe mobility and improved gait pattern    Plan:   Continue PT to address soft tissue and joint restrictions and provide instruction in a progressive therapeutic exercise program with manual and verbal cueing for proper form and technique    Date: 2022  Tx#:  Date:   2022  Tx#: 3/8 Date: Tx#: 4/ Date: Tx#: / Date: Tx#: / Date: Tx#: 7/ Date: Tx#: 8/   TherEx TherEx TherEx TherEx TherEx TherEx TherEx   Upright bike L5 x 8 min Upright bike L5 x 8 min        Prostretch gastroc stretch 3 x 30 sec Prostretch gastroc stretch 3 x 30 sec        Doorway gastroc stretch 3 x 30 sec Doorway gastroc stretch 3 x 30 sec        Shuttle Bilateral heel raises 4 bands 3 x 15 Airex heel raises 2 x 15        Sidestep/lunge 2 x 10 Rockerboard AP/Lateral rocking and balance        Forward step 2 x 10 Shuttle Bilateral heel raise 4 bands 3 x 15        Seated heel raises with ball between heels PROM great toe as tolerated        PROM great toe as tolerated Reviewed HEP        Reviewed HEP -        HEP: Seated heel raises, Doorway gastroc stretch, Standing gastroc stretch    Charges:  TherEx x 3       Total Timed Treatment: 40 min  Total Treatment Time: 40 min No

## 2022-06-01 ENCOUNTER — MED REC SCAN ONLY (OUTPATIENT)
Dept: ORTHOPEDICS CLINIC | Facility: CLINIC | Age: 42
End: 2022-06-01

## 2022-06-02 ENCOUNTER — OFFICE VISIT (OUTPATIENT)
Dept: PHYSICAL THERAPY | Age: 42
End: 2022-06-02
Attending: PODIATRIST
Payer: COMMERCIAL

## 2022-06-02 DIAGNOSIS — M20.5X1 HALLUX LIMITUS OF RIGHT FOOT: ICD-10-CM

## 2022-06-02 PROCEDURE — 97110 THERAPEUTIC EXERCISES: CPT

## 2022-06-02 NOTE — PROGRESS NOTES
Dx: Hallux limitus of right foot (M20.5X1)            Authorized # of Visits:  8  Fall Risk: standard         Precautions: n/a             Subjective: The patient reports he's been walking better, but still has pain and difficulty with pushing off on the right foot   Current Pain Ratin/10  Objective:   See flow sheet    Assessment:   The patient tolerated treatment well with improving great toe mobility and improved gait pattern    Plan:   Continue PT to address soft tissue and joint restrictions and provide instruction in a progressive therapeutic exercise program with manual and verbal cueing for proper form and technique    Date: 2022  Tx#:  Date:   2022  Tx#: 3/8 Date:   2022  Tx#:  Date: Tx#: / Date: Tx#: / Date: Tx#: / Date: Tx#: 8/   TherEx TherEx TherEx TherEx TherEx TherEx TherEx   Upright bike L5 x 8 min Upright bike L5 x 8 min Upright bike L5 x 8 min       Prostretch gastroc stretch 3 x 30 sec Prostretch gastroc stretch 3 x 30 sec Doorway gastroc stretch 3 x 30 sec       Doorway gastroc stretch 3 x 30 sec Doorway gastroc stretch 3 x 30 sec Shuttle Bilateral heel raises 6 bands 3 x 15       Shuttle Bilateral heel raises 4 bands 3 x 15 Airex heel raises 2 x 15 Standing soleus stretch with band providing anterior tibial glide 3 x 30 sec       Sidestep/lunge 2 x 10 Rockerboard AP/Lateral rocking and balance Rockerboard single leg AP rocking and balance       Forward step 2 x 10 Shuttle Bilateral heel raise 4 bands 3 x 15 PROM great toe as tolerated       Seated heel raises with ball between heels PROM great toe as tolerated Reviewed HEP       PROM great toe as tolerated Reviewed HEP Manual Therapy       Reviewed HEP - Talocrural AP glides grade 3       HEP: Seated heel raises, Doorway gastroc stretch, Standing gastroc stretch    Charges:  TherEx x 3       Total Timed Treatment: 40 min  Total Treatment Time: 40 min

## 2022-06-06 ENCOUNTER — PATIENT MESSAGE (OUTPATIENT)
Dept: INTERNAL MEDICINE CLINIC | Facility: CLINIC | Age: 42
End: 2022-06-06

## 2022-06-06 DIAGNOSIS — I10 ESSENTIAL HYPERTENSION: ICD-10-CM

## 2022-06-06 RX ORDER — LISINOPRIL 40 MG/1
40 TABLET ORAL DAILY
Qty: 90 TABLET | Refills: 0 | Status: SHIPPED | OUTPATIENT
Start: 2022-06-06

## 2022-06-06 RX ORDER — AMLODIPINE BESYLATE 5 MG/1
5 TABLET ORAL DAILY
Qty: 90 TABLET | Refills: 0 | Status: SHIPPED | OUTPATIENT
Start: 2022-06-06

## 2022-06-06 NOTE — TELEPHONE ENCOUNTER
PASSED per protocol, refill sent.   Last PE 5.6.22  Future Appointments   Date Time Provider Linda Edilia   6/7/2022  1:00 PM Avie Seek, PT [facility]   6/9/2022  1:00 PM Avie Seek, PT [facility]   6/14/2022  1:00 PM Avie Seek, PT [facility]   6/15/2022  2:15 PM Hobannabelle Sena., DPM [facility]   6/16/2022  1:00 PM Avie Seek, PT [facility]   6/21/2022  1:00 PM Avie Seek, PT [facility]   6/23/2022  1:00 PM Avie Seek, PT [facility]   7/5/2022  1:00 PM Avie Seek, PT [facility]   7/7/2022  9:00 AM Dusty Dobson MD [facility]   7/7/2022  1:00 PM Avie Seek, PT [facility]   11/2/2022  1:00 PM Kevin Sena., DPM [facility]

## 2022-06-06 NOTE — TELEPHONE ENCOUNTER
From: [facility]  To: Gautam Guido MD  Sent: 6/6/2022 2:19 PM CDT  Subject: Prescription - New Pharmacy    Good afternoon. My insurance company has denied both of my BP prescriptions (Lisinopril and Amlodipine) because I'm now required to have a 90 day supply. In order to do this, I must also change pharmacies from [facility] [address] to [facility] [address]. Can you please order my two prescriptions there and increase the amount to 90 day supplies, please?  I'm out of pills due to the length of time it took me to get in touch with the insurance co.    Thanks,   Georgia

## 2022-06-07 ENCOUNTER — OFFICE VISIT (OUTPATIENT)
Dept: PHYSICAL THERAPY | Age: 42
End: 2022-06-07
Attending: PODIATRIST
Payer: COMMERCIAL

## 2022-06-07 DIAGNOSIS — M20.5X1 HALLUX LIMITUS OF RIGHT FOOT: ICD-10-CM

## 2022-06-07 PROCEDURE — 97110 THERAPEUTIC EXERCISES: CPT

## 2022-06-07 NOTE — PROGRESS NOTES
Dx: Hallux limitus of right foot (M20.5X1)            Authorized # of Visits:  8  Fall Risk: standard         Precautions: n/a             Subjective: The patient reports he's been feeling good, with intermittent pain underneath the big toet   Current Pain Ratin/10  Objective:   See flow sheet    Assessment:   The patient continues to tolerate treatment well with no significant complaints of pain with exercises    Plan:   Progress mobility, stretching and strengthening as tolerated    Date: 2022  Tx#:  Date:   2022  Tx#: 3/8 Date:   2022  Tx#:  Date:   2022  Tx#:  Date: Tx#:  Date: Tx#:  Date: Tx#: 8/   TherEx TherEx TherEx TherEx TherEx TherEx TherEx   Upright bike L5 x 8 min Upright bike L5 x 8 min Upright bike L5 x 8 min Upright bike L5 x 8 min      Prostretch gastroc stretch 3 x 30 sec Prostretch gastroc stretch 3 x 30 sec Doorway gastroc stretch 3 x 30 sec Doorway gastroc stretch 3 x 1 min      Doorway gastroc stretch 3 x 30 sec Doorway gastroc stretch 3 x 30 sec Shuttle Bilateral heel raises 6 bands 3 x 15 Shuttle Bilateral heel raises 6 bands 3 x 15      Shuttle Bilateral heel raises 4 bands 3 x 15 Airex heel raises 2 x 15 Standing soleus stretch with band providing anterior tibial glide 3 x 30 sec Prostretch gastroc stretch 3 x 1 min      Sidestep/lunge 2 x 10 Rockerboard AP/Lateral rocking and balance Rockerboard single leg AP rocking and balance PROM great toe as tolerated      Forward step 2 x 10 Shuttle Bilateral heel raise 4 bands 3 x 15 PROM great toe as tolerated Rockerboard single leg AP rocking and balance      Seated heel raises with ball between heels PROM great toe as tolerated Reviewed HEP Single leg stance on Airex 3 x 30 sec      PROM great toe as tolerated Reviewed HEP Manual Therapy Reviewed HEP      Reviewed HEP - Talocrural AP glides grade 3       HEP: Seated heel raises, Doorway gastroc stretch, Standing gastroc stretch    Charges:  TherEx x 3 Total Timed Treatment: 38 min  Total Treatment Time: 38 min

## 2022-06-09 ENCOUNTER — OFFICE VISIT (OUTPATIENT)
Dept: PHYSICAL THERAPY | Age: 42
End: 2022-06-09
Attending: PODIATRIST
Payer: COMMERCIAL

## 2022-06-09 DIAGNOSIS — M20.5X1 HALLUX LIMITUS OF RIGHT FOOT: ICD-10-CM

## 2022-06-09 PROCEDURE — 97140 MANUAL THERAPY 1/> REGIONS: CPT

## 2022-06-09 PROCEDURE — 97110 THERAPEUTIC EXERCISES: CPT

## 2022-06-09 NOTE — PROGRESS NOTES
Dx: Hallux limitus of right foot (M20.5X1)            Authorized # of Visits:  8  Fall Risk: standard         Precautions: n/a             Subjective: The patient reports he felt pretty good yesterday. He reports some tightness in the calf after he does some stretching  Current Pain Ratin/10  Objective:   See flow sheet    Assessment:   The patient continues to tolerate treatment well with fatigue in the foot with balance exercises    Plan:   Progress mobility, stretching and strengthening as tolerated    Date: 2022  Tx#:  Date:   2022  Tx#: 3/8 Date:   2022  Tx#:  Date:   2022  Tx#:  Date:   2022  Tx#:  Date: Tx#: 7/ Date:    Tx#: 8/   TherEx TherEx TherEx TherEx TherEx TherEx TherEx   Upright bike L5 x 8 min Upright bike L5 x 8 min Upright bike L5 x 8 min Upright bike L5 x 8 min Elliptical L3 x 10 min     Prostretch gastroc stretch 3 x 30 sec Prostretch gastroc stretch 3 x 30 sec Doorway gastroc stretch 3 x 30 sec Doorway gastroc stretch 3 x 1 min Prostretch gastroc stretch 3 x 1 min     Doorway gastroc stretch 3 x 30 sec Doorway gastroc stretch 3 x 30 sec Shuttle Bilateral heel raises 6 bands 3 x 15 Shuttle Bilateral heel raises 6 bands 3 x 15 Rockerboard Single leg AP/Lateral rocking and balance     Shuttle Bilateral heel raises 4 bands 3 x 15 Airex heel raises 2 x 15 Standing soleus stretch with band providing anterior tibial glide 3 x 30 sec Prostretch gastroc stretch 3 x 1 min PROM great toe as tolerated     Sidestep/lunge 2 x 10 Rockerboard AP/Lateral rocking and balance Rockerboard single leg AP rocking and balance PROM great toe as tolerated SLS on Airex     Forward step 2 x 10 Shuttle Bilateral heel raise 4 bands 3 x 15 PROM great toe as tolerated Rockerboard single leg AP rocking and balance Manual Therapy     Seated heel raises with ball between heels PROM great toe as tolerated Reviewed HEP Single leg stance on Airex 3 x 30 sec Talocrural AP glides grade 3 PROM great toe as tolerated Reviewed HEP Manual Therapy Reviewed HEP Distal Tib/fib AP glides     Reviewed HEP - Talocrural AP glides grade 3  STM with the stick to gastroc/soleus     HEP: Seated heel raises, Doorway gastroc stretch, Standing gastroc stretch    Charges:  TherEx x 2; MT x 1       Total Timed Treatment: 40 min  Total Treatment Time: 40 min

## 2022-06-14 ENCOUNTER — OFFICE VISIT (OUTPATIENT)
Dept: PHYSICAL THERAPY | Age: 42
End: 2022-06-14
Attending: PODIATRIST
Payer: COMMERCIAL

## 2022-06-14 DIAGNOSIS — M20.5X1 HALLUX LIMITUS OF RIGHT FOOT: ICD-10-CM

## 2022-06-14 PROCEDURE — 97110 THERAPEUTIC EXERCISES: CPT

## 2022-06-14 NOTE — PROGRESS NOTES
Dx: Hallux limitus of right foot (M20.5X1)            Authorized # of Visits:  8  Fall Risk: standard         Precautions: n/a             Subjective: The patient continues to report overall he's been feeling better  Current Pain Ratin/10  Objective:   See flow sheet    Assessment:   The patient tolerated treatment well with no significant complaints of pain    Plan:   Progress mobility, stretching and strengthening as tolerated    Date: 2022  Tx#:  Date:   2022  Tx#: 3/8 Date:   2022  Tx#:  Date:   2022  Tx#:  Date:   2022  Tx#:  Date:   2022  Tx#:  Date:    Tx#: /   TherEx TherEx TherEx TherEx TherEx TherEx TherEx   Upright bike L5 x 8 min Upright bike L5 x 8 min Upright bike L5 x 8 min Upright bike L5 x 8 min Elliptical L3 x 10 min Elliptical L3 x 10 min    Prostretch gastroc stretch 3 x 30 sec Prostretch gastroc stretch 3 x 30 sec Doorway gastroc stretch 3 x 30 sec Doorway gastroc stretch 3 x 1 min Prostretch gastroc stretch 3 x 1 min Prostretch gastroc stretch 3 x 1 min    Doorway gastroc stretch 3 x 30 sec Doorway gastroc stretch 3 x 30 sec Shuttle Bilateral heel raises 6 bands 3 x 15 Shuttle Bilateral heel raises 6 bands 3 x 15 Rockerboard Single leg AP/Lateral rocking and balance PROM great toe as tolerated    Shuttle Bilateral heel raises 4 bands 3 x 15 Airex heel raises 2 x 15 Standing soleus stretch with band providing anterior tibial glide 3 x 30 sec Prostretch gastroc stretch 3 x 1 min PROM great toe as tolerated Rockerboard AP/lateral rocking and balance single leg; SLS on Airex    Sidestep/lunge 2 x 10 Rockerboard AP/Lateral rocking and balance Rockerboard single leg AP rocking and balance PROM great toe as tolerated SLS on Airex Standing heel raises 3 x 15    Forward step 2 x 10 Shuttle Bilateral heel raise 4 bands 3 x 15 PROM great toe as tolerated Rockerboard single leg AP rocking and balance Manual Therapy Manual Therapy    Seated heel raises with ball between heels PROM great toe as tolerated Reviewed HEP Single leg stance on Airex 3 x 30 sec Talocrural AP glides grade 3 Talocrural AP glides grade 3    PROM great toe as tolerated Reviewed HEP Manual Therapy Reviewed HEP Distal Tib/fib AP glides Distal Tib/fib AP glides grade 3    Reviewed HEP - Talocrural AP glides grade 3  STM with the stick to gastroc/soleus -    HEP: Seated heel raises, Doorway gastroc stretch, Standing gastroc stretch    Charges:  TherEx x 3       Total Timed Treatment: 40 min  Total Treatment Time: 40 min

## 2022-06-15 ENCOUNTER — OFFICE VISIT (OUTPATIENT)
Dept: ORTHOPEDICS CLINIC | Facility: CLINIC | Age: 42
End: 2022-06-15
Payer: COMMERCIAL

## 2022-06-15 VITALS — WEIGHT: 255 LBS | BODY MASS INDEX: 35.7 KG/M2 | HEIGHT: 71 IN

## 2022-06-15 DIAGNOSIS — M20.5X1 HALLUX LIMITUS OF RIGHT FOOT: Primary | ICD-10-CM

## 2022-06-15 PROCEDURE — 99024 POSTOP FOLLOW-UP VISIT: CPT | Performed by: PODIATRIST

## 2022-06-15 PROCEDURE — 3008F BODY MASS INDEX DOCD: CPT | Performed by: PODIATRIST

## 2022-06-15 NOTE — PROGRESS NOTES
[facility] Progress Note    Subjective:     Georgia is here for follow-up now over 6 weeks postop cheilectomy first MP joint of his right foot and use of biologic dressing as well. He has completed almost all of his therapy and feels his motion is better. He still has unable to do certain maneuvers such as swinging during baseball or pushing off. Objective:     Exam incision is healing well he has better range of motion than preop but it is still limited. Mild swelling persists. Mild tenderness in the first interspace but much improvement. Also some tenderness tightness atrophy of the intrinsic muscles which is normal, especially plantarly. Assessment/Plan:     Diagnoses and all orders for this visit:    Hallux limitus of right foot        He is doing well and he is progressing I would expect in the next 1 month to be able to comfortably play some baseball and start running on the foot. Continue to finish out physical therapy. He has good [place] type shoes. He does not need to follow-up with me at this point but he does have an arthritic joint so every 2 to 3 years it may be good to get a follow-up x-ray of the joint, WB views            Avril Martin. Hellen Wilson DPM  [facility]    [employer] speech recognition software was used to prepare this note. If a word or phrase is confusing, it is likely do to a failure of recognition. Please contact me with any questions or clarifications.

## 2022-06-16 ENCOUNTER — OFFICE VISIT (OUTPATIENT)
Dept: PHYSICAL THERAPY | Age: 42
End: 2022-06-16
Attending: PODIATRIST
Payer: COMMERCIAL

## 2022-06-16 DIAGNOSIS — M20.5X1 HALLUX LIMITUS OF RIGHT FOOT: ICD-10-CM

## 2022-06-16 PROCEDURE — 97110 THERAPEUTIC EXERCISES: CPT

## 2022-06-21 ENCOUNTER — APPOINTMENT (OUTPATIENT)
Dept: PHYSICAL THERAPY | Age: 42
End: 2022-06-21
Attending: PODIATRIST
Payer: COMMERCIAL

## 2022-06-23 ENCOUNTER — APPOINTMENT (OUTPATIENT)
Dept: PHYSICAL THERAPY | Age: 42
End: 2022-06-23
Attending: PODIATRIST
Payer: COMMERCIAL

## 2022-07-05 ENCOUNTER — APPOINTMENT (OUTPATIENT)
Dept: PHYSICAL THERAPY | Age: 42
End: 2022-07-05
Attending: PODIATRIST
Payer: COMMERCIAL

## 2022-07-07 ENCOUNTER — APPOINTMENT (OUTPATIENT)
Dept: PHYSICAL THERAPY | Age: 42
End: 2022-07-07
Attending: PODIATRIST
Payer: COMMERCIAL

## 2022-09-02 DIAGNOSIS — I10 ESSENTIAL HYPERTENSION: ICD-10-CM

## 2022-09-02 RX ORDER — LISINOPRIL 40 MG/1
TABLET ORAL
Qty: 90 TABLET | Refills: 0 | Status: SHIPPED | OUTPATIENT
Start: 2022-09-02

## 2022-09-02 RX ORDER — AMLODIPINE BESYLATE 5 MG/1
5 TABLET ORAL DAILY
Qty: 90 TABLET | Refills: 0 | Status: SHIPPED | OUTPATIENT
Start: 2022-09-02

## 2022-09-02 NOTE — TELEPHONE ENCOUNTER
PASSED per protocol, refills sent.   Last PE 5.6.22  Future Appointments   Date Time Provider Linda Yeboah   11/2/2022  1:00 PM Fiona Moser DPM [facility]

## 2022-11-02 ENCOUNTER — OFFICE VISIT (OUTPATIENT)
Dept: ORTHOPEDICS CLINIC | Facility: CLINIC | Age: 42
End: 2022-11-02
Payer: COMMERCIAL

## 2022-11-02 VITALS — OXYGEN SATURATION: 97 % | HEART RATE: 84 BPM | HEIGHT: 71 IN | WEIGHT: 258 LBS | BODY MASS INDEX: 36.12 KG/M2

## 2022-11-02 DIAGNOSIS — M20.5X1 HALLUX LIMITUS OF RIGHT FOOT: Primary | ICD-10-CM

## 2022-11-02 PROCEDURE — 99213 OFFICE O/P EST LOW 20 MIN: CPT | Performed by: PODIATRIST

## 2022-11-02 PROCEDURE — 3008F BODY MASS INDEX DOCD: CPT | Performed by: PODIATRIST

## 2022-11-02 NOTE — PROGRESS NOTES
[facility] Progress Note    Subjective:   Georgia is here for follow-up of his right foot  7 months post op right foot cheilectomy  Been doing boot camp, football drills with son, playing baseball , some running  Really having minimal pain but at times after a run he will feel little joint pain    Objective:     Exam right foot pretty good range of motion first MP joint. Incision is a little thickened but is going down in size. No swelling. Really no pain today. Assessment/Plan:     Diagnoses and all orders for this visit:    Hallux limitus of right foot        He is doing well and we discussed the fact that this is an arthritic joint and over time it may progress, become more narrow with less range of motion and he understands. He wears good Hoka shoes and really has no restrictions. I would recommend following up every 3 to 5 years with an x-ray but if he is having problems I would like to see him sooner            Barbi Mixon. Pito Sheriff DPM  [facility]    [employer] speech recognition software was used to prepare this note. If a word or phrase is confusing, it is likely do to a failure of recognition. Please contact me with any questions or clarifications.

## 2022-11-30 DIAGNOSIS — I10 ESSENTIAL HYPERTENSION: ICD-10-CM

## 2022-11-30 RX ORDER — LISINOPRIL 40 MG/1
TABLET ORAL
Qty: 90 TABLET | Refills: 0 | Status: SHIPPED | OUTPATIENT
Start: 2022-11-30

## 2022-11-30 RX ORDER — AMLODIPINE BESYLATE 5 MG/1
5 TABLET ORAL DAILY
Qty: 90 TABLET | Refills: 0 | Status: SHIPPED | OUTPATIENT
Start: 2022-11-30

## 2023-02-07 ENCOUNTER — OFFICE VISIT (OUTPATIENT)
Dept: INTERNAL MEDICINE CLINIC | Facility: CLINIC | Age: 43
End: 2023-02-07
Payer: COMMERCIAL

## 2023-02-07 VITALS
DIASTOLIC BLOOD PRESSURE: 88 MMHG | HEIGHT: 71 IN | WEIGHT: 265 LBS | SYSTOLIC BLOOD PRESSURE: 132 MMHG | BODY MASS INDEX: 37.1 KG/M2

## 2023-02-07 DIAGNOSIS — Z11.3 ROUTINE SCREENING FOR STI (SEXUALLY TRANSMITTED INFECTION): ICD-10-CM

## 2023-02-07 DIAGNOSIS — I10 ESSENTIAL HYPERTENSION: ICD-10-CM

## 2023-02-07 DIAGNOSIS — Z20.2 EXPOSURE TO TRICHOMONAS: Primary | ICD-10-CM

## 2023-02-07 PROCEDURE — 3008F BODY MASS INDEX DOCD: CPT | Performed by: FAMILY MEDICINE

## 2023-02-07 PROCEDURE — 87491 CHLMYD TRACH DNA AMP PROBE: CPT | Performed by: FAMILY MEDICINE

## 2023-02-07 PROCEDURE — 3079F DIAST BP 80-89 MM HG: CPT | Performed by: FAMILY MEDICINE

## 2023-02-07 PROCEDURE — 99213 OFFICE O/P EST LOW 20 MIN: CPT | Performed by: FAMILY MEDICINE

## 2023-02-07 PROCEDURE — 3075F SYST BP GE 130 - 139MM HG: CPT | Performed by: FAMILY MEDICINE

## 2023-02-07 PROCEDURE — 87591 N.GONORRHOEAE DNA AMP PROB: CPT | Performed by: FAMILY MEDICINE

## 2023-02-07 PROCEDURE — 87661 TRICHOMONAS VAGINALIS AMPLIF: CPT | Performed by: FAMILY MEDICINE

## 2023-02-08 LAB
C TRACH DNA SPEC QL NAA+PROBE: NEGATIVE
N GONORRHOEA DNA SPEC QL NAA+PROBE: NEGATIVE

## 2023-02-09 ENCOUNTER — LAB ENCOUNTER (OUTPATIENT)
Dept: LAB | Age: 43
End: 2023-02-09
Attending: FAMILY MEDICINE
Payer: COMMERCIAL

## 2023-02-09 DIAGNOSIS — Z11.3 ROUTINE SCREENING FOR STI (SEXUALLY TRANSMITTED INFECTION): ICD-10-CM

## 2023-02-09 LAB
T PALLIDUM AB SER QL IA: NONREACTIVE
TRICHOMONAS VAGINALIS BY TMA: NEGATIVE

## 2023-02-09 PROCEDURE — 87389 HIV-1 AG W/HIV-1&-2 AB AG IA: CPT

## 2023-02-09 PROCEDURE — 36415 COLL VENOUS BLD VENIPUNCTURE: CPT

## 2023-02-09 PROCEDURE — 86780 TREPONEMA PALLIDUM: CPT

## 2023-03-02 DIAGNOSIS — I10 ESSENTIAL HYPERTENSION: ICD-10-CM

## 2023-03-03 RX ORDER — AMLODIPINE BESYLATE 5 MG/1
5 TABLET ORAL DAILY
Qty: 90 TABLET | Refills: 0 | Status: SHIPPED | OUTPATIENT
Start: 2023-03-03

## 2023-03-03 RX ORDER — LISINOPRIL 40 MG/1
TABLET ORAL
Qty: 90 TABLET | Refills: 0 | Status: SHIPPED | OUTPATIENT
Start: 2023-03-03

## 2023-03-08 NOTE — Clinical Note
Chief Complaint   Patient presents with   • Office Visit   • Follow-up   • Thyroid Problem     Hypothyroidism due to Hashimoto's thyroiditis       Ms. Montes is a 61 year old female with PMHx of brain metastases, vasogenic edema, HTN, HLD, hypothyroidism, SCLC, hypotension, hypomagnesemia, hyponatremia, hypocortisolism, acute encephalopathy, COPD presenting for hospital follow up and management of hypothyroidism and secondary adrenal insufficiency. Here with daughter.  Last seen in 10/2022. Pt taking Levothyroxine 100 mcg 6 days a week, taking it appropriately. Doing well. Nothing new to report. Keeps her medications in a pill box, other daughter lives with her. No hospitalizations since 9/2022. Has gained 28 lbs since 10/2022. She has a sweet tooth. No heat or cold intolerance. No changes in bowel habits. Pt denies any tremors or palpitations. No difficulty swallowing or breathing.     Secondary adrenal insufficiency- Pt is taking Hydrocortisone 20 mg in AM and 10 mg in PM. Occasionally taking an extra tab in the afternoon if not feeling well, this doesn't occur often. Pt had an episode where she wasn't feeling well, was disoriented. Took another hydrocortisone pill. Pt denies fatigue and dizziness on standing. Pt has medic alert bracelet stating she has adrenal insufficiency. On immunotherapy.       General: no fatigue, no fever, no weight loss, no appetite changes, + Has gained 28 lbs since 10/2022. She has a sweet tooth   Resp: no dyspnea, no cough   GI: no abdominal pain, no diarrhea, no nausea, no vomiting           Vitals  Visit Vitals  /84 (BP Location: LUE - Left upper extremity, Patient Position: Sitting, Cuff Size: Large Adult)   Pulse 85   Ht 5' 1\" (1.549 m)   Wt 82.1 kg (181 lb)   SpO2 98%   BMI 34.20 kg/m²       Physical Exam  General: No apparent distress, well appearing,  HEENT: EOMI, no proptosis, no thyromegaly, no nodules   Chest: CTA, no crackles or rhonchi   CVS: S1 S2, no murmur   Abd:  Dr. Fer Urbina,  This patient is a very good surgical candidate. Interested in weight loss. Could you help get him in?  TY! Soft, non tender   Ext: No edema   CNS: Conscious, alert, oriented, speech intact  Skin: No rash    Discussion/Summary  Endocrine Impression: 60 year old female with PMHx of brain metastases, vasogenic edema, HTN, HLD, hypothyroidism, SCLC, hypotension, hypomagnesemia, hyponatremia, hypocortisolism, acute encephalopathy, COPD presenting for follow up and management of hypothyroidism and secondary adrenal insufficiency. Daughter and grandson live with her. Her daughter sets up her medication.     Pt admitted to [facility] from 6/8/2022-6/19/2022 with weakness and low BP from her oncologist's office. she was started on steroids given her AM cortisol and ACTH test. Endocrine consulted for abnormal TFTs. was on IV Levothyroxine thyroxine, then switched to oral. discharged on levothyroxine 125 mcg daily, HC 20 mg BID.     Pt admitted to [facility] on 9/24/2022. She presented to the ER with generalized weakness. On arrival at the ED patient was normotensive, not tachypneic or tachycardic.  Patient was found to have a UTI on this visit and started on Ceftriaxone. Endocrinology consulted for Hypocortisolism 2/2 secondary adrenal insufficiency and trial of stress dose steroids. Cortisol on admission was <0.5 which reflects the fact pt was likely not taking her hydrocortisone regularly. Pt was on stress dosing with 40 mg BID while in patient. Discharge recs: continue hydrocortisone 20 mg in AM and 10 mg in afternoon. discharge on Levothyroxine 150 mcg 5 days a week.      Hypothyroidism due to Hashimoto's, atezolizumab PDL1 can cause thyroid dysfunction, adrenal insufficiency hypophysitis :   THYROID TESTS Latest Ref Rng & Units 11/4/2021 11/29/2021 1/3/2022   TSH 0.350 - 5.000 mcUnits/mL 0.085 (L) 0.110 (L) 0.025 (L)   T3, Total 0.60 - 1.81 ng/mL         T4, Free 0.8 - 1.5 ng/dL 1.6 (H) 1.5 1.3   T3, Free 2.2 - 4.0 pg/mL   3.0 3.8      THYROID TESTS Latest Ref Rng & Units 2/14/2022 4/11/2022 5/25/2022   TSH 0.350 - 5.000 mcUnits/mL 0.016  (L) <0.008 (L) <0.008 (L)   T3, Total 0.60 - 1.81 ng/mL         T4, Free 0.8 - 1.5 ng/dL 1.7 (H) 1.6 (H) 1.8 (H)   T3, Free 2.2 - 4.0 pg/mL            THYROID TESTS Latest Ref Rng & Units 6/9/2022   TSH 0.350 - 5.000 mcUnits/mL     T3, Total 0.60 - 1.81 ng/mL 1.71   T4, Free 0.8 - 1.5 ng/dL 1.7 (H)   T3, Free 2.2 - 4.0 pg/mL 4.2 (H)      THYROID TESTS Latest Ref Rng & Units 6/11/2022 6/12/2022   TSH 0.350 - 5.000 mcUnits/mL       T3, Total 0.60 - 1.81 ng/mL 1.64 1.12   T4, Free 0.8 - 1.5 ng/dL 1.5 1.5   T3, Free 2.2 - 4.0 pg/mL 3.0 2.1 (L)       Lab Results   Component Value Date    TSH 0.386 02/07/2023    TSH 0.216 (L) 01/18/2023    TSH 0.138 (L) 12/28/2022    T4FREE 1.3 11/04/2022    T4FREE 1.6 (H) 10/07/2022    FT3 2.5 09/24/2022   - TT3 1.12 wnl in 6/2022  - TSH rec AB <0.80 neg in 6/2022  - TSI <0.10 neg in 6/2022  - Anti Thyroglobulin <0.9 wnl in 6/2022  -  elevated in 6/2022  - Pt taking Levothyroxine 100 mcg 6 days a week, continue same   - Her other daughter sets up her medications for the week in a pill box.   - check TSH, FT4 and adjust dosage if needed    Secondary Adrenal insufficiency, likely related to immunotherapy, also pt with remote Hx of left adrenalectomy for adrenal adenoma  - risk factors for adrenal insufficiency: immunotherapy, radiation   - CT c/a from 4/2022 reported  Left adrenal gland is not visualized.  Right adrenal gland is normal.  Lab Results   Component Value Date    CORAM <0.5 (L) 09/25/2022    CORAM <0.5 (L) 06/09/2022     Sodium (mmol/L)   Date Value   03/02/2023 138     Potassium (mmol/L)   Date Value   03/02/2023 3.6   - baseline cortisol <0.5 low in 6/2022  - cortisol post 30 min 2.5 low and post 60 min 3.8 low in 6/2022   - AM cortisol <0.5 low on 9/25/2022, previously <0.5 low in 6/2022  - ACTH 5.1 low in 6/2022  - Pt is taking Hydrocortisone 20 mg in AM and 10 mg in PM. Occasionally taking an extra tab in the afternoon if not feeling well, this doesn't occur  often  - sick day management discussed again. Pt had an episode where she wasn't feeling well, was disoriented. Took another hydrocortisone pill. Disorientation not a typical sx of adrenal insufficiency. This was a while ago. She f/u with neurology. Advised to call PCP and neurologist if this occurs again. sx of adrenal insufficiency include hypotension, abdominal symptoms, dizziness.    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 CT showed R lower lobe lung mass and MRI head showed brain mets. Seen by NSGY, had craniotomy with resection of large mass and biopsy c/w metastatic poorly differentiated small cell carcinoma. Started chemo after with carbo/etoposide and teccentriq (Atezolizumab, PD-L1 Immunotherapy). Completed WBRT by end of 11/2021 (10 session). Was getting dexamethasone with chemo until 03/2022. No further dex since then. Started just Atezolizumab maintnenance in 03/2022 and has been on since then   - Started chemo after with carbo/etoposide and teccentriq (Atezolizumab, PD-L1 Immunotherapy). Completed WBRT by end of 11/2021 (10 session). Was getting dexamethasone with chemo until 03/2022. No further dex since then. Started just Atezolizumab maintnenance in 03/2022 and has been on since then   - NM whole body 6/13: Right parietal craniotomy defect.  Remainder of the findings are consistent with osteoarthritic change and/or degenerative joint disease.  No definite scintigraphic  evidence of osteoblastic osseous metastatic disease.  - PET CT from 12/2022 reported The thyroid/thyroid bed, are unremarkable on PET.  - Dr. Noel's note reviewed from 2/14/2023: as per Dr. Noel's note, Now on teccentriq maintenance  - Status post 6 cycles of chemo with carbo etoposide and teccentriq  - Status postcraniotomy resection and radiation  - immunotherapy restarted end of 6/2022. Pt is on  tecentriq/atezolizumab   - atezolizumab PDL1 can cause thyroid dysfunction, adrenal insufficiency hypophysitis   - plans for chemo currently on hold due to insurance reasons. Still receiving immunotherapy   - will monitor thyroid function, glucose, sodium, potassium on regular basis   - mgmt per oncology     Glycemia  Lab Results   Component Value Date    CREATININE 0.57 03/02/2023    GFRESTIMATE >90 03/02/2023    TSH 0.386 02/07/2023   - glucose 111 on labs in 3/2023      Other:   - FSH 15.6 wnl in 6/2022  - prolactin 13.0 wnl in 6/2022    Other imaging:  - MRI brain from 6/2022 reports the sella appears normal.   - MRI brain from 9/25/2022 reported no pituitary findings  - CT c/a/p from 9/2022 reported pancreas, adrenal glands are within normal limits.  - MRI brain from 1/2023 reported no pituitary findings    Weight gain  - weight: 181 lbs in 3/2023, 153 lbs in 10/2022   - Has gained 28 lbs since 10/2022. She has a sweet tooth. Having a donut daily  - recommend to watch sugar intake  - TSH wnl  - steroid use can lead to weight gain but not on physiological doses       I thank Dr. Ashutosh Woody for allowing me to participate in the care of the patient If any of the questions are unanswered please don't hesitate to give me a call.     Plan  Management Plan and Instructions:          - if you have an episode where you become disoriented again, please call your primary doctor and neurologist right away. Also go to the emergency room   - Continue Hydrocortisone 20 mg in AM and 10 mg in PM mg.   - Double up the dosage of  Hydrocortisone on days when feeling sick. Let us know if this occurs  - Will need IV stress doses of steroids with procedures/surgeries and severe illnesses  - Do not abruptly stop Hydrocortisone  - Continue Levothyroxine 100 mcg 6 days a week  - Complete blood work as per orders at an ACL lab  - Follow up in 6 months     Scribe Attestation:  On 3/9/2023, [facility] scribed the services personally performed by Antonette Elder MD MD Attestation:  The documentation recorded by the scribe accurately and completely reflects the service(s) I personally performed and the decisions made by me.       Allergies  ALLERGIES:  No Known Allergies    MEDICATIONS  Current Outpatient Medications   Medication Sig Dispense Refill   • levETIRAcetam (KepPRA) 500 MG tablet TAKE 1 TABLET BY MOUTH EVERY 12 HOURS 180 tablet 1   • levothyroxine 100 MCG tablet Take 1 tablet by mouth 6 days a week. 90 tablet 1   • rosuvastatin (CRESTOR) 10 MG tablet TAKE 1 TABLET BY MOUTH DAILY 90 tablet 0   • hydroCORTisone (CORTEF) 10 MG tablet Take 1 tablet by mouth daily after dinner. 30 tablet 5   • LORazepam (Ativan) 0.5 MG tablet Take 1 tablet by mouth 1 time for 1 dose. Take 20 minutes prior to MRI 1 tablet 0   • memantine (NAMENDA) 10 MG tablet Take 1 tablet by mouth in the morning and 1 tablet in the evening. 180 tablet 0   • hydroCORTisone (CORTEF) 20 MG tablet Take 1 tablet by mouth every morning. 30 tablet 5   • acetaminophen (TYLENOL) 500 MG tablet Take 2 tablets by mouth 2 times daily as needed for Pain. 100 tablet 11   • Calcium Carbonate Antacid (TUMS PO) Take 1 tablet by mouth daily as needed.     • Multiple Vitamins-Minerals (Multivitamin Adults) Tab Take 1 each by mouth daily.     • Ascorbic Acid (VITAMIN C PO) Take 2 each by mouth daily.     • B Complex Vitamins (vitamin B complex) tablet Take 1 tablet by mouth in the morning and 1 tablet in the evening.     • aspirin 81 MG EC tablet Take 1 tablet by mouth daily.        No  current facility-administered medications for this visit.       HISTORIES  Active Problems  Patient Active Problem List   Diagnosis   • Brain metastases (CMD)   • Vasogenic edema (CMD)   • Primary hypertension   • Mixed hyperlipidemia   • Cigarette nicotine dependence with nicotine-induced disorder   • Hypothyroidism   • Palliative care encounter   • Counseling regarding advanced care planning and goals of care   • SCLC (small cell lung carcinoma), right (CMD)   • Hypotension   • Hyponatremia   • Hypomagnesemia   • Generalized weakness   • Secondary adrenal insufficiency (CMD)   • Acute encephalopathy   • Acute respiratory failure with hypoxia (CMD)   • COPD (chronic obstructive pulmonary disease) (CMD)   • Dyslipidemia   • Current chronic use of systemic steroids   • Weakness   • Severe protein-calorie malnutrition (CMD)   • Hospital discharge follow-up   • Diverticulosis of large intestine without perforation or abscess without bleeding       Past Medical History  Past Medical History:   Diagnosis Date   • Adrenal insufficiency (CMD)    • Anesthesia     NO REACTION   • Arthritis    • COPD (chronic obstructive pulmonary disease) (CMD)    • COVID-19 2020   • Essential (primary) hypertension    • H/O brain surgery 2021 NOV 2021 / ON KEPPRA TO PREVENT SEIZURES / NEVER HAD SEIZURE   • Heart murmur    • Heartburn    • High cholesterol    • History of chemotherapy    • History of radiation therapy    • History of recent hospitalization     JULY 2022   • Hypothyroidism    • Immunotherapy     CURRENTLY   • Malignant neoplasm (CMD)     lung cancer dx nov 2021 / also had brain cancer / had brain surgery / chemo and radiation therapy   • Port-A-Cath in place     right chest   • Sinusitis, chronic        Surgical History  Past Surgical History:   Procedure Laterality Date   • Abdomen surgery  2018    had adrenal tumor removed   • Colonoscopy diagnostic      8 yrs ago   • Craniotomy Right 10/26/2021    CRANIOTOMY, FOR  INTRACRANIAL NEOPLASM EXCISION       Family History  Family History   Problem Relation Age of Onset   • Patient is unaware of any medical problems Mother    • Patient is unaware of any medical problems Father        Social History  Social History     Socioeconomic History   • Marital status:      Spouse name: Not on file   • Number of children: Not on file   • Years of education: Not on file   • Highest education level: Not on file   Occupational History   • Not on file   Tobacco Use   • Smoking status: Former     Packs/day: 1.00     Years: 20.00     Pack years: 20.00     Types: Cigarettes     Quit date: 2020     Years since quitting: 3.1   • Smokeless tobacco: Never   Vaping Use   • Vaping Use: never used   Substance and Sexual Activity   • Alcohol use: Not Currently     Comment: very seldom   • Drug use: Not Currently   • Sexual activity: Not on file   Other Topics Concern   • Not on file   Social History Narrative   • Not on file     Social Determinants of Health     Financial Resource Strain: Not on file   Food Insecurity: Not on file   Transportation Needs: Not on file   Physical Activity: Not on file   Stress: Not on file   Social Connections: Not on file   Intimate Partner Violence: Not At Risk   • Social Determinants: Intimate Partner Violence Past Fear: No   • Social Determinants: Intimate Partner Violence Current Fear: No       Review  Past medical history, problem list, family medical history, surgical history and social history reviewed.

## 2023-04-21 ENCOUNTER — OFFICE VISIT (OUTPATIENT)
Dept: INTERNAL MEDICINE CLINIC | Facility: CLINIC | Age: 43
End: 2023-04-21
Payer: COMMERCIAL

## 2023-04-21 VITALS
TEMPERATURE: 98 F | HEIGHT: 71 IN | WEIGHT: 264 LBS | HEART RATE: 83 BPM | DIASTOLIC BLOOD PRESSURE: 78 MMHG | BODY MASS INDEX: 36.96 KG/M2 | RESPIRATION RATE: 16 BRPM | SYSTOLIC BLOOD PRESSURE: 122 MMHG | OXYGEN SATURATION: 97 %

## 2023-04-21 DIAGNOSIS — M25.551 GREATER TROCHANTERIC PAIN SYNDROME OF RIGHT LOWER EXTREMITY: ICD-10-CM

## 2023-04-21 DIAGNOSIS — M53.3 SI (SACROILIAC) JOINT DYSFUNCTION: ICD-10-CM

## 2023-04-21 DIAGNOSIS — M25.551 RIGHT HIP PAIN: Primary | ICD-10-CM

## 2023-04-21 PROCEDURE — 99213 OFFICE O/P EST LOW 20 MIN: CPT | Performed by: FAMILY MEDICINE

## 2023-04-21 PROCEDURE — 3008F BODY MASS INDEX DOCD: CPT | Performed by: FAMILY MEDICINE

## 2023-04-21 PROCEDURE — 3074F SYST BP LT 130 MM HG: CPT | Performed by: FAMILY MEDICINE

## 2023-04-21 PROCEDURE — 3078F DIAST BP <80 MM HG: CPT | Performed by: FAMILY MEDICINE

## 2023-06-04 DIAGNOSIS — I10 ESSENTIAL HYPERTENSION: ICD-10-CM

## 2023-06-05 RX ORDER — LISINOPRIL 40 MG/1
TABLET ORAL
Qty: 90 TABLET | Refills: 0 | Status: SHIPPED | OUTPATIENT
Start: 2023-06-05

## 2023-06-05 RX ORDER — AMLODIPINE BESYLATE 5 MG/1
5 TABLET ORAL DAILY
Qty: 90 TABLET | Refills: 0 | Status: SHIPPED | OUTPATIENT
Start: 2023-06-05

## 2023-06-05 NOTE — TELEPHONE ENCOUNTER
Hypertension Medications Protocol Failed 06/04/2023 07:53 AM   Protocol Details  CMP or BMP in past 12 months    Last serum creatinine< 2.0    Appointment in past 6 or next 3 months         No future visits scheduled    Pt seen 4/21/23 for acute concerns.  Also 2/7/23        Last wellness 5/6/2022    PSR's please call to schedule annual - routing to PCP for refills

## 2023-06-16 ENCOUNTER — TELEPHONE (OUTPATIENT)
Dept: INTERNAL MEDICINE CLINIC | Facility: CLINIC | Age: 43
End: 2023-06-16

## 2023-06-16 NOTE — TELEPHONE ENCOUNTER
Received Psychiatric Physical Therapy progress note from 06/15/2023. Placed in 's bin to review and sign.

## 2023-07-13 ENCOUNTER — HOSPITAL ENCOUNTER (OUTPATIENT)
Dept: GENERAL RADIOLOGY | Age: 43
Discharge: HOME | End: 2023-07-13
Attending: FAMILY MEDICINE
Payer: COMMERCIAL

## 2023-07-13 ENCOUNTER — OFFICE VISIT (OUTPATIENT)
Dept: INTERNAL MEDICINE CLINIC | Facility: CLINIC | Age: 43
End: 2023-07-13
Payer: COMMERCIAL

## 2023-07-13 VITALS
OXYGEN SATURATION: 98 % | HEART RATE: 92 BPM | DIASTOLIC BLOOD PRESSURE: 72 MMHG | SYSTOLIC BLOOD PRESSURE: 128 MMHG | BODY MASS INDEX: 37 KG/M2 | WEIGHT: 263 LBS

## 2023-07-13 DIAGNOSIS — M54.41 CHRONIC RIGHT-SIDED LOW BACK PAIN WITH RIGHT-SIDED SCIATICA: Primary | ICD-10-CM

## 2023-07-13 DIAGNOSIS — M25.551 RIGHT HIP PAIN: ICD-10-CM

## 2023-07-13 DIAGNOSIS — M54.41 CHRONIC RIGHT-SIDED LOW BACK PAIN WITH RIGHT-SIDED SCIATICA: ICD-10-CM

## 2023-07-13 DIAGNOSIS — G89.29 CHRONIC RIGHT-SIDED LOW BACK PAIN WITH RIGHT-SIDED SCIATICA: ICD-10-CM

## 2023-07-13 DIAGNOSIS — G89.29 CHRONIC RIGHT-SIDED LOW BACK PAIN WITH RIGHT-SIDED SCIATICA: Primary | ICD-10-CM

## 2023-07-13 PROCEDURE — 99214 OFFICE O/P EST MOD 30 MIN: CPT | Performed by: FAMILY MEDICINE

## 2023-07-13 PROCEDURE — 3078F DIAST BP <80 MM HG: CPT | Performed by: FAMILY MEDICINE

## 2023-07-13 PROCEDURE — 3074F SYST BP LT 130 MM HG: CPT | Performed by: FAMILY MEDICINE

## 2023-07-13 PROCEDURE — 72110 X-RAY EXAM L-2 SPINE 4/>VWS: CPT | Performed by: FAMILY MEDICINE

## 2023-07-13 RX ORDER — METHYLPREDNISOLONE 4 MG/1
TABLET ORAL
Qty: 1 EACH | Refills: 0 | Status: SHIPPED | OUTPATIENT
Start: 2023-07-13

## 2023-08-16 ENCOUNTER — MED REC SCAN ONLY (OUTPATIENT)
Dept: INTERNAL MEDICINE CLINIC | Facility: CLINIC | Age: 43
End: 2023-08-16

## 2023-08-16 DIAGNOSIS — M54.16 LUMBAR RADICULOPATHY: ICD-10-CM

## 2023-08-16 DIAGNOSIS — M51.26 LUMBAR DISC HERNIATION: Primary | ICD-10-CM

## 2023-08-21 ENCOUNTER — TELEPHONE (OUTPATIENT)
Dept: INTERNAL MEDICINE CLINIC | Facility: CLINIC | Age: 43
End: 2023-08-21

## 2023-08-21 ENCOUNTER — OFFICE VISIT (OUTPATIENT)
Dept: ORTHOPEDICS CLINIC | Facility: CLINIC | Age: 43
End: 2023-08-21
Payer: COMMERCIAL

## 2023-08-21 VITALS — BODY MASS INDEX: 36.82 KG/M2 | HEIGHT: 71 IN | WEIGHT: 263 LBS

## 2023-08-21 DIAGNOSIS — Z13.29 SCREENING FOR THYROID DISORDER: ICD-10-CM

## 2023-08-21 DIAGNOSIS — M51.9 LUMBAR DISC DISEASE: Primary | ICD-10-CM

## 2023-08-21 DIAGNOSIS — Z13.220 SCREENING FOR LIPID DISORDERS: ICD-10-CM

## 2023-08-21 DIAGNOSIS — Z00.00 ROUTINE GENERAL MEDICAL EXAMINATION AT A HEALTH CARE FACILITY: Primary | ICD-10-CM

## 2023-08-21 DIAGNOSIS — Z13.0 SCREENING FOR DISORDER OF BLOOD AND BLOOD-FORMING ORGANS: ICD-10-CM

## 2023-08-21 DIAGNOSIS — Z13.228 SCREENING FOR METABOLIC DISORDER: ICD-10-CM

## 2023-08-30 ENCOUNTER — OFFICE VISIT (OUTPATIENT)
Dept: PAIN CLINIC | Facility: CLINIC | Age: 43
End: 2023-08-30
Payer: COMMERCIAL

## 2023-08-30 VITALS
WEIGHT: 267 LBS | OXYGEN SATURATION: 98 % | HEART RATE: 85 BPM | BODY MASS INDEX: 37 KG/M2 | DIASTOLIC BLOOD PRESSURE: 78 MMHG | SYSTOLIC BLOOD PRESSURE: 136 MMHG

## 2023-08-30 DIAGNOSIS — M54.16 LUMBAR RADICULITIS: Primary | ICD-10-CM

## 2023-08-30 DIAGNOSIS — M51.26 HNP (HERNIATED NUCLEUS PULPOSUS), LUMBAR: ICD-10-CM

## 2023-08-30 PROCEDURE — 3078F DIAST BP <80 MM HG: CPT | Performed by: PHYSICIAN ASSISTANT

## 2023-08-30 PROCEDURE — 99204 OFFICE O/P NEW MOD 45 MIN: CPT | Performed by: PHYSICIAN ASSISTANT

## 2023-08-30 PROCEDURE — 3075F SYST BP GE 130 - 139MM HG: CPT | Performed by: PHYSICIAN ASSISTANT

## 2023-09-01 ENCOUNTER — TELEPHONE (OUTPATIENT)
Dept: PAIN CLINIC | Facility: CLINIC | Age: 43
End: 2023-09-01

## 2023-09-07 DIAGNOSIS — I10 ESSENTIAL HYPERTENSION: ICD-10-CM

## 2023-09-07 RX ORDER — LISINOPRIL 40 MG/1
40 TABLET ORAL DAILY
Qty: 90 TABLET | Refills: 0 | Status: SHIPPED | OUTPATIENT
Start: 2023-09-07

## 2023-09-07 RX ORDER — AMLODIPINE BESYLATE 5 MG/1
5 TABLET ORAL DAILY
Qty: 90 TABLET | Refills: 0 | Status: SHIPPED | OUTPATIENT
Start: 2023-09-07

## 2023-09-07 NOTE — TELEPHONE ENCOUNTER
Requested Prescriptions     Pending Prescriptions Disp Refills    AMLODIPINE 5 MG Oral Tab [Pharmacy Med Name: AMLODIPINE BESYLATE 5 MG TAB] 90 tablet 0     Sig: TAKE 1 TABLET (5 MG TOTAL) BY MOUTH DAILY. LISINOPRIL 40 MG Oral Tab [Pharmacy Med Name: LISINOPRIL 40 MG TABLET] 90 tablet 0     Sig: TAKE 1 TABLET BY MOUTH EVERY DAY       LOV: 7/13/23    LAST PHYSICAL: 5/6/22    LAST REFILL: lisinopril-90-6/5/23  Amlodipine-90-6/5/23    LAST LAB: 5/2/22    Your Appointments      Tuesday September 19, 2023  Pain Procedure with MD Abby Alexandra [address] (--) [place] [id number] [phone]   Masks are optional for all patients and visitors, unless otherwise indicated. Wednesday September 20, 2023  7:45 AM  Laboratory Services with PF [address] [facility], [address] [facility]  [phone]        Tuesday September 26, 2023  7:40 AM  Adult [facility] [address] ([facility] [place]) [address] [facility] [id number] [phone]   PLEASE NOTE - Most insurances allow a Complete Physical once every 366 days. Please schedule accordingly. Please arrive 15 minutes prior to your scheduled appointment. Please also bring your Insurance card, Photo ID, and your medication bottles or a list of your current medication. If you no longer require this appointment, please contact your physician office to cancel.

## 2023-09-20 ENCOUNTER — LAB ENCOUNTER (OUTPATIENT)
Dept: LAB | Age: 43
End: 2023-09-20
Attending: FAMILY MEDICINE
Payer: COMMERCIAL

## 2023-09-20 DIAGNOSIS — Z13.29 SCREENING FOR THYROID DISORDER: ICD-10-CM

## 2023-09-20 DIAGNOSIS — Z13.0 SCREENING FOR DISORDER OF BLOOD AND BLOOD-FORMING ORGANS: ICD-10-CM

## 2023-09-20 DIAGNOSIS — Z13.220 SCREENING FOR LIPID DISORDERS: ICD-10-CM

## 2023-09-20 DIAGNOSIS — Z00.00 ROUTINE GENERAL MEDICAL EXAMINATION AT A HEALTH CARE FACILITY: ICD-10-CM

## 2023-09-20 DIAGNOSIS — Z13.228 SCREENING FOR METABOLIC DISORDER: ICD-10-CM

## 2023-09-20 LAB
ALBUMIN SERPL-MCNC: 4 G/DL (ref 3.4–5)
ALBUMIN/GLOB SERPL: 1.3 {RATIO} (ref 1–2)
ALP LIVER SERPL-CCNC: 52 U/L
ALT SERPL-CCNC: 37 U/L
ANION GAP SERPL CALC-SCNC: 7 MMOL/L (ref 0–18)
AST SERPL-CCNC: 25 U/L (ref 15–37)
BASOPHILS # BLD AUTO: 0.03 X10(3) UL (ref 0–0.2)
BASOPHILS NFR BLD AUTO: 0.7 %
BILIRUB SERPL-MCNC: 0.8 MG/DL (ref 0.1–2)
BUN BLD-MCNC: 10 MG/DL (ref 7–18)
CALCIUM BLD-MCNC: 8.8 MG/DL (ref 8.5–10.1)
CHLORIDE SERPL-SCNC: 109 MMOL/L (ref 98–112)
CHOLEST SERPL-MCNC: 139 MG/DL (ref ?–200)
CO2 SERPL-SCNC: 26 MMOL/L (ref 21–32)
CREAT BLD-MCNC: 1.17 MG/DL
EGFRCR SERPLBLD CKD-EPI 2021: 78 ML/MIN/1.73M2 (ref 60–?)
EOSINOPHIL # BLD AUTO: 0.11 X10(3) UL (ref 0–0.7)
EOSINOPHIL NFR BLD AUTO: 2.5 %
ERYTHROCYTE [DISTWIDTH] IN BLOOD BY AUTOMATED COUNT: 14.3 %
FASTING PATIENT LIPID ANSWER: YES
FASTING STATUS PATIENT QL REPORTED: YES
GLOBULIN PLAS-MCNC: 3.1 G/DL (ref 2.8–4.4)
GLUCOSE BLD-MCNC: 104 MG/DL (ref 70–99)
HCT VFR BLD AUTO: 44.6 %
HDLC SERPL-MCNC: 45 MG/DL (ref 40–59)
HGB BLD-MCNC: 14.1 G/DL
IMM GRANULOCYTES # BLD AUTO: 0.02 X10(3) UL (ref 0–1)
IMM GRANULOCYTES NFR BLD: 0.5 %
LDLC SERPL CALC-MCNC: 64 MG/DL (ref ?–100)
LYMPHOCYTES # BLD AUTO: 1.25 X10(3) UL (ref 1–4)
LYMPHOCYTES NFR BLD AUTO: 28.3 %
MCH RBC QN AUTO: 24.7 PG (ref 26–34)
MCHC RBC AUTO-ENTMCNC: 31.6 G/DL (ref 31–37)
MCV RBC AUTO: 78.2 FL
MONOCYTES # BLD AUTO: 0.33 X10(3) UL (ref 0.1–1)
MONOCYTES NFR BLD AUTO: 7.5 %
NEUTROPHILS # BLD AUTO: 2.68 X10 (3) UL (ref 1.5–7.7)
NEUTROPHILS # BLD AUTO: 2.68 X10(3) UL (ref 1.5–7.7)
NEUTROPHILS NFR BLD AUTO: 60.5 %
NONHDLC SERPL-MCNC: 94 MG/DL (ref ?–130)
OSMOLALITY SERPL CALC.SUM OF ELEC: 293 MOSM/KG (ref 275–295)
PLATELET # BLD AUTO: 265 10(3)UL (ref 150–450)
POTASSIUM SERPL-SCNC: 3.3 MMOL/L (ref 3.5–5.1)
PROT SERPL-MCNC: 7.1 G/DL (ref 6.4–8.2)
RBC # BLD AUTO: 5.7 X10(6)UL
SODIUM SERPL-SCNC: 142 MMOL/L (ref 136–145)
TRIGL SERPL-MCNC: 181 MG/DL (ref 30–149)
TSI SER-ACNC: 0.42 MIU/ML (ref 0.36–3.74)
VLDLC SERPL CALC-MCNC: 27 MG/DL (ref 0–30)
WBC # BLD AUTO: 4.4 X10(3) UL (ref 4–11)

## 2023-09-20 PROCEDURE — 80053 COMPREHEN METABOLIC PANEL: CPT

## 2023-09-20 PROCEDURE — 36415 COLL VENOUS BLD VENIPUNCTURE: CPT

## 2023-09-20 PROCEDURE — 84443 ASSAY THYROID STIM HORMONE: CPT

## 2023-09-20 PROCEDURE — 85025 COMPLETE CBC W/AUTO DIFF WBC: CPT

## 2023-09-20 PROCEDURE — 80061 LIPID PANEL: CPT

## 2023-09-26 ENCOUNTER — OFFICE VISIT (OUTPATIENT)
Dept: INTERNAL MEDICINE CLINIC | Facility: CLINIC | Age: 43
End: 2023-09-26
Payer: COMMERCIAL

## 2023-09-26 VITALS
WEIGHT: 258 LBS | SYSTOLIC BLOOD PRESSURE: 136 MMHG | TEMPERATURE: 97 F | BODY MASS INDEX: 36.12 KG/M2 | HEART RATE: 86 BPM | DIASTOLIC BLOOD PRESSURE: 84 MMHG | HEIGHT: 71 IN

## 2023-09-26 DIAGNOSIS — I10 ESSENTIAL HYPERTENSION: Chronic | ICD-10-CM

## 2023-09-26 DIAGNOSIS — F43.21 ADJUSTMENT DISORDER WITH DEPRESSED MOOD: ICD-10-CM

## 2023-09-26 DIAGNOSIS — Z12.11 SCREENING FOR COLON CANCER: ICD-10-CM

## 2023-09-26 DIAGNOSIS — E87.6 HYPOKALEMIA: ICD-10-CM

## 2023-09-26 DIAGNOSIS — R71.8 MICROCYTOSIS: ICD-10-CM

## 2023-09-26 DIAGNOSIS — Z00.00 WELLNESS EXAMINATION: Primary | ICD-10-CM

## 2023-09-26 DIAGNOSIS — M54.16 LUMBAR RADICULOPATHY: ICD-10-CM

## 2023-09-26 DIAGNOSIS — R45.7 EMOTIONAL STRESS: ICD-10-CM

## 2023-09-26 PROCEDURE — 3075F SYST BP GE 130 - 139MM HG: CPT | Performed by: FAMILY MEDICINE

## 2023-09-26 PROCEDURE — 3079F DIAST BP 80-89 MM HG: CPT | Performed by: FAMILY MEDICINE

## 2023-09-26 PROCEDURE — 99396 PREV VISIT EST AGE 40-64: CPT | Performed by: FAMILY MEDICINE

## 2023-09-26 PROCEDURE — 3008F BODY MASS INDEX DOCD: CPT | Performed by: FAMILY MEDICINE

## 2023-10-14 ENCOUNTER — LAB ENCOUNTER (OUTPATIENT)
Dept: LAB | Age: 43
End: 2023-10-14
Attending: FAMILY MEDICINE
Payer: COMMERCIAL

## 2023-10-14 DIAGNOSIS — E87.6 HYPOKALEMIA: ICD-10-CM

## 2023-10-14 DIAGNOSIS — R71.8 MICROCYTOSIS: ICD-10-CM

## 2023-10-14 LAB
ANION GAP SERPL CALC-SCNC: 2 MMOL/L (ref 0–18)
BASOPHILS # BLD AUTO: 0.04 X10(3) UL (ref 0–0.2)
BASOPHILS NFR BLD AUTO: 0.6 %
BUN BLD-MCNC: 15 MG/DL (ref 7–18)
CALCIUM BLD-MCNC: 9.1 MG/DL (ref 8.5–10.1)
CHLORIDE SERPL-SCNC: 110 MMOL/L (ref 98–112)
CO2 SERPL-SCNC: 30 MMOL/L (ref 21–32)
CREAT BLD-MCNC: 1.32 MG/DL
DEPRECATED HBV CORE AB SER IA-ACNC: 161.7 NG/ML
EGFRCR SERPLBLD CKD-EPI 2021: 68 ML/MIN/1.73M2 (ref 60–?)
EOSINOPHIL # BLD AUTO: 0.11 X10(3) UL (ref 0–0.7)
EOSINOPHIL NFR BLD AUTO: 1.7 %
ERYTHROCYTE [DISTWIDTH] IN BLOOD BY AUTOMATED COUNT: 13.9 %
FASTING STATUS PATIENT QL REPORTED: NO
GLUCOSE BLD-MCNC: 95 MG/DL (ref 70–99)
HCT VFR BLD AUTO: 44.4 %
HGB BLD-MCNC: 13.9 G/DL
IMM GRANULOCYTES # BLD AUTO: 0.02 X10(3) UL (ref 0–1)
IMM GRANULOCYTES NFR BLD: 0.3 %
IRON SATN MFR SERPL: 19 %
IRON SERPL-MCNC: 71 UG/DL
LYMPHOCYTES # BLD AUTO: 1.42 X10(3) UL (ref 1–4)
LYMPHOCYTES NFR BLD AUTO: 22.4 %
MCH RBC QN AUTO: 25.2 PG (ref 26–34)
MCHC RBC AUTO-ENTMCNC: 31.3 G/DL (ref 31–37)
MCV RBC AUTO: 80.6 FL
MONOCYTES # BLD AUTO: 0.54 X10(3) UL (ref 0.1–1)
MONOCYTES NFR BLD AUTO: 8.5 %
NEUTROPHILS # BLD AUTO: 4.22 X10 (3) UL (ref 1.5–7.7)
NEUTROPHILS # BLD AUTO: 4.22 X10(3) UL (ref 1.5–7.7)
NEUTROPHILS NFR BLD AUTO: 66.5 %
OSMOLALITY SERPL CALC.SUM OF ELEC: 295 MOSM/KG (ref 275–295)
PLATELET # BLD AUTO: 273 10(3)UL (ref 150–450)
POTASSIUM SERPL-SCNC: 3.5 MMOL/L (ref 3.5–5.1)
RBC # BLD AUTO: 5.51 X10(6)UL
SODIUM SERPL-SCNC: 142 MMOL/L (ref 136–145)
TIBC SERPL-MCNC: 381 UG/DL (ref 240–450)
TRANSFERRIN SERPL-MCNC: 256 MG/DL (ref 200–360)
WBC # BLD AUTO: 6.4 X10(3) UL (ref 4–11)

## 2023-10-14 PROCEDURE — 82728 ASSAY OF FERRITIN: CPT

## 2023-10-14 PROCEDURE — 36415 COLL VENOUS BLD VENIPUNCTURE: CPT

## 2023-10-14 PROCEDURE — 80048 BASIC METABOLIC PNL TOTAL CA: CPT

## 2023-10-14 PROCEDURE — 83540 ASSAY OF IRON: CPT

## 2023-10-14 PROCEDURE — 83550 IRON BINDING TEST: CPT

## 2023-10-14 PROCEDURE — 85025 COMPLETE CBC W/AUTO DIFF WBC: CPT

## 2023-12-07 ENCOUNTER — HOSPITAL ENCOUNTER (OUTPATIENT)
Facility: HOSPITAL | Age: 43
Setting detail: HOSPITAL OUTPATIENT SURGERY
Discharge: HOME | End: 2023-12-07
Attending: ANESTHESIOLOGY | Admitting: ANESTHESIOLOGY
Payer: COMMERCIAL

## 2023-12-07 ENCOUNTER — APPOINTMENT (OUTPATIENT)
Dept: GENERAL RADIOLOGY | Facility: HOSPITAL | Age: 43
End: 2023-12-07
Attending: ANESTHESIOLOGY
Payer: COMMERCIAL

## 2023-12-07 VITALS
WEIGHT: 258 LBS | RESPIRATION RATE: 18 BRPM | SYSTOLIC BLOOD PRESSURE: 136 MMHG | OXYGEN SATURATION: 95 % | TEMPERATURE: 98 F | BODY MASS INDEX: 36.12 KG/M2 | HEART RATE: 95 BPM | DIASTOLIC BLOOD PRESSURE: 79 MMHG | HEIGHT: 71 IN

## 2023-12-07 PROCEDURE — 64484 NJX AA&/STRD TFRM EPI L/S EA: CPT | Performed by: ANESTHESIOLOGY

## 2023-12-07 PROCEDURE — 64483 NJX AA&/STRD TFRM EPI L/S 1: CPT | Performed by: ANESTHESIOLOGY

## 2023-12-07 PROCEDURE — 3E0R33Z INTRODUCTION OF ANTI-INFLAMMATORY INTO SPINAL CANAL, PERCUTANEOUS APPROACH: ICD-10-PCS | Performed by: ANESTHESIOLOGY

## 2023-12-07 RX ORDER — LIDOCAINE HYDROCHLORIDE 10 MG/ML
INJECTION, SOLUTION EPIDURAL; INFILTRATION; INTRACAUDAL; PERINEURAL
Status: DISCONTINUED | OUTPATIENT
Start: 2023-12-07 | End: 2023-12-07

## 2023-12-07 RX ORDER — DEXAMETHASONE SODIUM PHOSPHATE 10 MG/ML
INJECTION, SOLUTION INTRAMUSCULAR; INTRAVENOUS
Status: DISCONTINUED | OUTPATIENT
Start: 2023-12-07 | End: 2023-12-07

## 2023-12-07 RX ORDER — SODIUM CHLORIDE 9 MG/ML
INJECTION INTRAVENOUS
Status: DISCONTINUED | OUTPATIENT
Start: 2023-12-07 | End: 2023-12-07

## 2023-12-07 NOTE — H&P
History & Physical Examination    Patient Name: Cintia Dillard  MRN: [id number]  [facility]: [id number]  YOB: 1980    Pre-Operative Diagnosis:  Lumbar radiculitis [M54.16]    Present Illness: Lumbar radiculitis    Medications Prior to Admission   Medication Sig Dispense Refill Last Dose    amLODIPine 5 MG Oral Tab Take 1 tablet (5 mg total) by mouth daily. 90 tablet 0     lisinopril 40 MG Oral Tab Take 1 tablet (40 mg total) by mouth daily. 90 tablet 0     Cholecalciferol (VITAMIN D) 50 MCG (2000 UT) Oral Cap Take 1 capsule (2,000 Units total) by mouth daily. No current facility-administered medications for this encounter. Allergies: No Known Allergies    Past Medical History:   Diagnosis Date    Essential hypertension     Patellar tendinitis of right knee 12/2/2016    Plantar fasciitis, right 2/26/2016     Past Surgical History:   Procedure Laterality Date    CORRECTION HALLUX VALGUS Right 04/25/2022    right foot great toe cheilectomy     REPAIR ROTATOR CUFF,ACUTE Right     SHOULDER ARTHROSCOPY      + REPAIR RIGHT     Family History   Problem Relation Age of Onset    Diabetes Father     Stroke Father     Psychiatric Mother     Other (benign brain lesion) Mother      Social History     Tobacco Use    Smoking status: Never    Smokeless tobacco: Never   Substance Use Topics    Alcohol use: No     Alcohol/week: 0.0 standard drinks of alcohol       SYSTEM Check if Review is Normal Check if Physical Exam is Normal If not normal, please explain:   HEENT [x ] [x ]    NECK & BACK [x ] [x ]    HEART [x ] [x ]    LUNGS [x ] [x ]    ABDOMEN [x ] [x ]    UROGENITAL [x ] [x ]    EXTREMITIES [x ] [x ]    OTHER        [ x ] I have discussed the risks and benefits and alternatives with the patient/family. They understand and agree to proceed with plan of care. [ x ] I have reviewed the History and Physical done within the last 30 days. Any changes noted above.     Lani Richardson MD

## 2023-12-07 NOTE — OPERATIVE REPORT
[facility]  Operative Report  2023     Alfie Acuna Patient Status:  Hospital Outpatient Surgery    1980 MRN [id number]   Location [address] [address] Attending Bailey Mejia MD   [facility] Day # 0 PCP Anderson Mayes MD     Indication: Isael Avila is a 37year old male lumbar radiculitis    Preoperative Diagnosis:  Lumbar radiculitis [M54.16]    Postoperative Diagnosis: Same as above. Procedure performed: right L4, and L5 TRANSFORAMINAL LUMBAR EPIDURAL STEROID INJECTION  with local     Anesthesia: Local      EBL: Less than 1 ml. Procedure Description:  After reviewing the patient's history and performing a focused physical examination, the diagnosis was confirmed and contraindications such as infection and coagulopathy were ruled out. Following review of potential side effects and complications, including but not necessarily limited to infection, allergic reaction, local tissue breakdown, nerve injury, and paresis, the patient indicated they understood and agreed to proceed. After obtaining the informed consent, the patient was brought to the procedure room and monitored. In the prone position, following sterile prep and drape of the lumbar region,  the  L4 neural foramen was identified under fluoroscopy. The skin and subcutaneous tissue was anesthetized via 25-gauge 1.5\" needle with approximately 2 cc of 1% lidocaine. A 22-gauge 5\" Quincke spinal needle was introduced toward the inferior aspect of the junction between the transverse process and pedicle of the  L4 level atraumatically under fluoroscopic guidance. The needle was advanced into the anterior epidural space at this level. The needle position was confirmed under AP and lateral fluoroscopic view. Following negative aspiration for CSF and blood, approximately 1 cc of Omnipaque 240 was injected. An excellent contrast spread along the epidural space and the nerve root was obtained.   At this point, 1cc of normal saline with 5 mg of dexamethasone was injected without complication. The needle was withdrawn with stylet in situ after being flushed with 1 cc PF lidocaine. The  L5 neural foramen was also identified under fluoroscopy. The skin and subcutaneous tissue was anesthetized via 25-gauge 1.5\" needle with approximately 2 cc of 1% lidocaine. A 22-gauge 5\" Quincke spinal needle was introduced toward the inferior aspect of the junction between the transverse process and pedicle of the L5 level atraumatically under fluoroscopic guidance. The needle was advanced into the anterior epidural space at this level. The needle position was confirmed under AP and lateral fluoroscopic view. Following negative aspiration for CSF and blood, approximately 1 cc of Omnipaque 240 was injected. An excellent contrast spread along the epidural space and the nerve root was obtained. At this point, 1cc of normal saline with 5 mg of dexamethasone was injected without complication. The needle was withdrawn with stylet in situ after being flushed with 1 cc PF lidocaine. .  The patient tolerated procedure very well. The patient was observed until discharge criteria met. Discharge instructions were given and patient was released to a responsible adult. Complications: None. Follow up: The patient was followed in the pain clinic as needed basis.         Drea Godinez MD

## 2023-12-07 NOTE — DISCHARGE INSTRUCTIONS
Home Care Instructions Following Your Pain Procedure     [place],  It has been a pleasure to have you as our patient. To help you at home, you must follow these general discharge instructions. We will review these with you before you are discharged. It is our hope that you have a complete and uneventful recovery from our procedure. General Instructions:  What to Expect:  Bandages from your procedure today can be removed when you get home. Please avoid soaking and/or swimming for 24 hours. Showering is okay  It is normal to have increased pain symptoms and/or pain at injection site for up to 3-5 days after procedure, you can use heat or ice (20 minutes on 20 minutes off) for comfort. You may experience some temporary side effects which may include restlessness or insomnia, flushing of the face, or heart palpitations. Please contact the provider if these symptoms do not resolve within 3-4 days. Lightheadedness or nausea may occur and should resolve within 24 to 48 hours. If you develop a headache after treatment, rest, drink fluids (with caffeine, if possible) and take mild over-the-counter pain medication. If the headache does not improve with the above treatment, contact the physician. Home Medications:  Resume all previously prescribed medication. Please avoid taking NSAIDs (Non-Steriodal Anti-Inflammatory Drugs) such as:  Ibuprofen ( Advil, Motrin) Aleve (Naproxen), Diclofenac, Meloxicam for 6 hours after procedure. If you are on Coumadin (Warfarin) or any other anti-coagulant (or \"blood thinning\") medication such as Plavix (Clopidogrel), Xarelto (Rivaroxaban), Eliquis (Apixaban), Effient (Prasugrel) etc., restart on the following day from the procedure unless otherwise directed by your provider. If you are a diabetic, please increase the frequency of your glucose monitoring after the procedure as steroids may cause a temporary (2-3 day) increase in your blood sugar.   Contact your primary care physician if your blood sugar remains elevated as you may require some medication adjustment. Diet:  Resume your regular diet as tolerated. Activity: We recommend that you relax and rest during the rest of your procedure day. If you feel weakness in your arms or legs do not drive. Follow-up Appointment  Please schedule a follow-up visit within 3 to 4 weeks after your last procedure date. Question or Concerns:  Feel free to call our office with any questions or concerns at [phone] (option #2)    [place]  Thank you for coming to [facility] for your procedure. The nurses try very hard to make sure you receive the best care possible. Your trust in them as well as us is greatly appreciated.     Thanks so much,   Dr. Richy Sanders

## 2023-12-08 ENCOUNTER — TELEPHONE (OUTPATIENT)
Dept: PAIN CLINIC | Facility: CLINIC | Age: 43
End: 2023-12-08

## 2023-12-08 NOTE — TELEPHONE ENCOUNTER
Courtesy called placed to patient for post procedure follow up. Patient stated he is doing well, feels bloated and tighness around stomach. Advised to hydrate and see if that helps flush out the steroid. Pt verbalized understanding to call with any questions or concerns.       Procedure:   right L4, and L5 TRANSFORAMINAL LUMBAR EPIDURAL STEROID INJECTION  with local       Date: 12/7/23  Follow up Visit Scheduled: 12/22/23

## 2023-12-09 DIAGNOSIS — I10 ESSENTIAL HYPERTENSION: ICD-10-CM

## 2023-12-10 RX ORDER — LISINOPRIL 40 MG/1
40 TABLET ORAL DAILY
Qty: 90 TABLET | Refills: 0 | Status: SHIPPED | OUTPATIENT
Start: 2023-12-10

## 2023-12-10 RX ORDER — AMLODIPINE BESYLATE 5 MG/1
5 TABLET ORAL DAILY
Qty: 90 TABLET | Refills: 0 | Status: SHIPPED | OUTPATIENT
Start: 2023-12-10

## 2024-03-05 ENCOUNTER — OFFICE VISIT (OUTPATIENT)
Dept: INTERNAL MEDICINE CLINIC | Facility: CLINIC | Age: 44
End: 2024-03-05
Payer: COMMERCIAL

## 2024-03-05 VITALS
HEART RATE: 97 BPM | DIASTOLIC BLOOD PRESSURE: 80 MMHG | RESPIRATION RATE: 16 BRPM | WEIGHT: 271 LBS | BODY MASS INDEX: 37.94 KG/M2 | HEIGHT: 71 IN | SYSTOLIC BLOOD PRESSURE: 120 MMHG

## 2024-03-05 DIAGNOSIS — F41.9 ANXIETY: ICD-10-CM

## 2024-03-05 DIAGNOSIS — I10 ESSENTIAL HYPERTENSION: Chronic | ICD-10-CM

## 2024-03-05 DIAGNOSIS — Z51.81 THERAPEUTIC DRUG MONITORING: ICD-10-CM

## 2024-03-05 DIAGNOSIS — E78.1 PURE HYPERTRIGLYCERIDEMIA: Primary | Chronic | ICD-10-CM

## 2024-03-05 DIAGNOSIS — E66.9 CLASS II OBESITY: ICD-10-CM

## 2024-03-05 PROCEDURE — 99204 OFFICE O/P NEW MOD 45 MIN: CPT | Performed by: INTERNAL MEDICINE

## 2024-03-05 NOTE — PROGRESS NOTES
HISTORY OF PRESENT ILLNESS  Chief Complaint   Patient presents with    Weight Problem     Recommendation from pcp , no med, wants info on meds.        Srinivas Gaona is a 43 year old male new to our office today for initiation of medical weight loss program.  Patient presents today with c/o excess weight.     , 4 kids, 3 still at home   Works as    Does a lot of sitting   Over the last 15 years starting developing weight issues  Did a meal replacement heriberto huang   Lost 20 lb on average and do respond to weight loss       Wt Readings from Last 6 Encounters:   03/05/24 271 lb (122.9 kg)   12/06/23 258 lb (117 kg)   09/26/23 258 lb (117 kg)   08/30/23 267 lb (121.1 kg)   08/21/23 263 lb (119.3 kg)   07/13/23 263 lb (119.3 kg)          Title    General Information  Patient stated 6 month goal: Lose 20lbs   Patient stated 1 year goal: Get off blood pressure pills.  Lose 40lbs total.  Help ease sciatica pain.   Patient stated readiness to make a Lifestyle Change (0 = no desire; 10 = extremely motivated): 9 Patient stated willingness to take medication(s) for weight loss (0 = no interest; 10 = ready to start): 3 Patient stated level of interest in bariatric surgery (0 = not interested; 10 = very interested): 0   Patient agreement to achieve  weight loss: Show up for scheduled follow-up appointments, timely complete tests as discussed and ordered (labs, cardiac testing, sleep study and/or imaging), work toward goal of exercising 30 minutes 5 days, 150 minutes/week, make an appointment with Basilio Santacruz dietician to assist in making dietary changes, take medication as prescribed and contact clinic if any questions or concerns regarding medication, reach the goal of losing 5-10% of total body weight within 6 months of consultation, ask for clarification, help or support when needed and give myself credit for my accomplishments and patience during this process.: I agree  Previous Weight  Loss  Referral source to [facility]: Primary Care Provider  Patient stated previous weight loss history: Meal replacement shakes  Patient stated eating behaviors/patterns that have been barriers to weight loss success in the past: Tons of stress.  My love of food.  24 Hour Food Journal  Breakfast: 2 cups oatmeal with milk and tsp brown sugar Lunch: Frozen chicken parm and spaghetti   Dinner: 2 eggs, 1 cup grits, 2 sausage 1 warren    Fluids: 4 cups darren oolong tea and decaf green tea.  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  Weightlifting   Patient stated coping strategies: Eating   Sleep   Patient stated # hours uninterrupted sleep: 7 Patient stated # times awakened: 1 Patient stated feels restful: No   Patient stated snores: No Patient stated has sleep apnea: No Patient stated uses sleep device: None                    Breakfast Lunch Dinner Snacks Fluids   Reviewed    Struggles with portion sizes   Likes to eat   2-3 times        Social hx and lifestyle reviewed:    Work:     Marital status: yes   Support: good   Tobacco use: neg  ETOH use: neg  Supplements: vitamin D , cbd   Exercise: not much with sciatica and flared sciatica   Recently got the shot and started lifting again   Stress level: 10/10  Sleep hours and integrity: 7 hours on average     MEDICAL HISTORY  PMH reviewed:   Cardiac disorders:negative    Depression/anxiety: negative   Glaucoma: negative   Kidney stones: negative   Eating disorder: negative   Migraines: negative   Seizures: negative   Joint-related conditions:negative   Liver disease: negative   Renal  disease: negative   Diabetes: negative  Thyroid disease: negative   Constipation: negative  DVT: negative    Family or personal history of Pancreatic issues / Medullary Thyroid Cancer: negative    History of bariatric surgery: negative     FMH reviewed: obesity in parent/s or sibling: some obesity in dad   Dad with dementia    REVIEW OF SYSTEMS  GENERAL: feels well otherwise,   NECK: denies thickening   LUNGS: denies shortness of breath with exertion, no apnea   CARDIOVASCULAR: denies chest pain on exertion , denies palpitations or pedal edema  GI: denies abdominal pain.  No N/V/D/C  MUSCULOSKELETAL: denies joint pains , + back pain   NEURO: denies headaches   PSYCH: denies change in behavior or mood, denies feeling sad or depressed     EXAM  /80   Pulse 97   Resp 16   Ht 5' 11\" (1.803 m)   Wt 271 lb (122.9 kg)   BMI 37.80 kg/m² ,   GENERAL: well developed, well nourished, in no apparent distress,   SKIN: warm, pink, dry without rashes to exposed area   EYES: conjunctiva pink, sclera non icteric, PERRL  HEENT: atraumatic, normocephalic, O/p: Mallampati score- 2  NECK: supple, non tender, no adenopathy, no thyromegaly,   LUNGS: CTA in all fields, breathing non labored  CARDIO: RRR without murmur, normal S1 and S2 without clicks or gallops, no pedal edema.   GI: rotund, no masses, HSM or tendernes  MUSCULOSKELETAL: grossly intact   NEURO: Oriented times three, full ROM of bilateral UE/LE  PSYCH: pleasant, cooperative, normal mood and affect,     Lab Results   Component Value Date    WBC 6.4 10/14/2023    RBC 5.51 10/14/2023    HGB 13.9 10/14/2023    HCT 44.4 10/14/2023    MCV 80.6 10/14/2023    MCH 25.2 (L) 10/14/2023    MCHC 31.3 10/14/2023    RDW 13.9 10/14/2023    .0 10/14/2023     Lab Results   Component Value Date    GLU 95 10/14/2023    BUN 15 10/14/2023    BUNCREA 12.4 03/19/2021    CREATSERUM 1.32 (H) 10/14/2023    ANIONGAP 2 10/14/2023    GFR 63 11/30/2017    GFRNAA 74 05/02/2022    GFRAA 86  05/02/2022    CA 9.1 10/14/2023    OSMOCALC 295 10/14/2023    ALKPHO 52 09/20/2023    AST 25 09/20/2023    ALT 37 09/20/2023    BILT 0.8 09/20/2023    TP 7.1 09/20/2023    ALB 4.0 09/20/2023    GLOBULIN 3.1 09/20/2023     10/14/2023    K 3.5 10/14/2023     10/14/2023    CO2 30.0 10/14/2023     Lab Results   Component Value Date     03/19/2021    A1C 5.3 03/19/2021     Lab Results   Component Value Date    CHOLEST 139 09/20/2023    TRIG 181 (H) 09/20/2023    HDL 45 09/20/2023    LDL 64 09/20/2023    VLDL 27 09/20/2023    TCHDLRATIO 2.45 02/11/2017    NONHDLC 94 09/20/2023     Lab Results   Component Value Date    TSH 0.424 09/20/2023     Lab Results   Component Value Date    B12 483 11/30/2017     Lab Results   Component Value Date    VITD 39.4 10/15/2021       Current Outpatient Medications on File Prior to Visit   Medication Sig Dispense Refill    AMLODIPINE 5 MG Oral Tab TAKE 1 TABLET (5 MG TOTAL) BY MOUTH DAILY. 90 tablet 0    LISINOPRIL 40 MG Oral Tab TAKE 1 TABLET BY MOUTH EVERY DAY 90 tablet 0    Cholecalciferol (VITAMIN D) 50 MCG (2000 UT) Oral Cap Take 1 capsule (2,000 Units total) by mouth daily.       No current facility-administered medications on file prior to visit.       ASSESSMENT  Initial Weight Data and Goal Weight Loss:       Diagnoses and all orders for this visit:    Pure hypertriglyceridemia  -     [facility] DIETIT[facility] - [place] Location    Essential hypertension  -     [facility] DI[facility] - [place] Location    Anxiety  -     [facility] DIET[facility] Location    Therapeutic drug monitoring  -     [facility]    Class II obesity  -     [facility] DIETIT[facility] Location        PLAN  Medication use and  side effects reviewed with patient.  Medication contraindications: n/a   Not interested in medicaiton management at this time   Reviewed labwork   Trial of protein monitoring and dietitan visit   Referral for health coaching to discuss safe physical activity   Reviewed BHI not interested at this time   Reviewed dietary changes for HTG   Needs to incorporate physical activity   Reviewed weight loss and treatment of HTN   Follow up with dietitian and psychologist as recommended.  Discussed the role of sleep and stress in weight management.  Labs orders as above.  Counseled on comprehensive weight loss plan including attention to nutrition, exercise and behavior/stress management for success. See patient instruction below for more details.  Weight Loss Consent to treat reviewed and signed.    There are no Patient Instructions on file for this visit.    Return in about 3 months (around 6/5/2024).    Patient verbalizes understanding.    Belgica Hernandez MD

## 2024-03-10 DIAGNOSIS — I10 ESSENTIAL HYPERTENSION: ICD-10-CM

## 2024-03-11 RX ORDER — LISINOPRIL 40 MG/1
40 TABLET ORAL DAILY
Qty: 90 TABLET | Refills: 0 | Status: SHIPPED | OUTPATIENT
Start: 2024-03-11

## 2024-03-11 RX ORDER — AMLODIPINE BESYLATE 5 MG/1
5 TABLET ORAL DAILY
Qty: 90 TABLET | Refills: 0 | Status: SHIPPED | OUTPATIENT
Start: 2024-03-11

## 2024-05-30 ENCOUNTER — TELEPHONE (OUTPATIENT)
Dept: FAMILY MEDICINE CLINIC | Facility: CLINIC | Age: 44
End: 2024-05-30

## 2024-05-30 ENCOUNTER — OFFICE VISIT (OUTPATIENT)
Dept: INTERNAL MEDICINE CLINIC | Facility: CLINIC | Age: 44
End: 2024-05-30

## 2024-05-30 VITALS
SYSTOLIC BLOOD PRESSURE: 128 MMHG | HEIGHT: 70.87 IN | BODY MASS INDEX: 37.8 KG/M2 | RESPIRATION RATE: 18 BRPM | HEART RATE: 88 BPM | WEIGHT: 270 LBS | DIASTOLIC BLOOD PRESSURE: 82 MMHG | TEMPERATURE: 98 F | OXYGEN SATURATION: 96 %

## 2024-05-30 DIAGNOSIS — R68.83 CHILLS: ICD-10-CM

## 2024-05-30 DIAGNOSIS — J02.9 SORE THROAT: Primary | ICD-10-CM

## 2024-05-30 LAB
CONTROL LINE PRESENT WITH A CLEAR BACKGROUND (YES/NO): PRESENT YES/NO
KIT LOT #: NORMAL NUMERIC
STREP GRP A CUL-SCR: NEGATIVE

## 2024-05-30 PROCEDURE — 87880 STREP A ASSAY W/OPTIC: CPT

## 2024-05-30 PROCEDURE — 87081 CULTURE SCREEN ONLY: CPT

## 2024-05-30 PROCEDURE — 99214 OFFICE O/P EST MOD 30 MIN: CPT

## 2024-05-30 RX ORDER — AMOXICILLIN 500 MG/1
500 CAPSULE ORAL 2 TIMES DAILY
Qty: 20 CAPSULE | Refills: 0 | Status: SHIPPED | OUTPATIENT
Start: 2024-05-30 | End: 2024-06-09

## 2024-05-30 NOTE — TELEPHONE ENCOUNTER
Future Appointments   Date Time Provider Department Center   5/30/2024  1:30 PM Taylor Nelson, ROG[facility]

## 2024-05-30 NOTE — PROGRESS NOTES
Srinivas Gaona is a 43 year old male.   Chief Complaint   Patient presents with    Sore Throat     ES rm  - 4 - sore throat.    Fatigue     Fatigue starting    Body ache and/or chills     Chills 5/26     HPI:    Last 4 days with sore throat, body aches, chills. Unknown fever. Taking dayquil which helps. Mild headache. Appetite has been normal.unknown ill contacts. No travel. Throat pain worse today, painful to swallow. Increase in post nasal drip. Slight nausea reported intermittently but not present at this time.     Allergies:  No Known Allergies   Current Meds:  Current Outpatient Medications   Medication Sig Dispense Refill    amoxicillin 500 MG Oral Cap Take 1 capsule (500 mg total) by mouth 2 (two) times daily for 10 days. 20 capsule 0    AMLODIPINE 5 MG Oral Tab TAKE 1 TABLET (5 MG TOTAL) BY MOUTH DAILY. 90 tablet 0    LISINOPRIL 40 MG Oral Tab TAKE 1 TABLET BY MOUTH EVERY DAY 90 tablet 0    Cholecalciferol (VITAMIN D) 50 MCG (2000 UT) Oral Cap Take 1 capsule (2,000 Units total) by mouth daily.          PMH:     Past Medical History:    Essential hypertension    Patellar tendinitis of right knee    Plantar fasciitis, right       ROS:   Review of Systems   Constitutional:  Positive for chills and fatigue.   HENT:  Positive for postnasal drip and sore throat. Negative for ear pain and sinus pain.    Respiratory:  Negative for shortness of breath.    Cardiovascular:  Negative for chest pain.   Gastrointestinal:  Negative for abdominal pain and diarrhea.   Allergic/Immunologic: Negative for environmental allergies.   Neurological:  Positive for headaches.       PHYSICAL EXAM:    /82 (BP Location: Left arm, Patient Position: Sitting, Cuff Size: large)   Pulse 88   Temp 97.7 °F (36.5 °C) (Temporal)   Resp 18   Ht 5' 10.87\" (1.8 m)   Wt 270 lb (122.5 kg)   SpO2 96%   BMI 37.80 kg/m²   Physical Exam  HENT:      Right Ear: Tympanic membrane normal.      Left Ear: Tympanic membrane normal.       Mouth/Throat:      Mouth: Mucous membranes are moist.      Pharynx: Oropharyngeal exudate and posterior oropharyngeal erythema present.   Cardiovascular:      Rate and Rhythm: Normal rate.   Pulmonary:      Effort: Pulmonary effort is normal.      Breath sounds: Normal breath sounds.       Assessment and plan:  Sore throat  Tylenol/ibuprofen as needed for body aches, pains, or fever. Call if symptoms worsen. Rapid negative, awaiting culture.  - amoxicillin 500 MG Oral Cap; Take 1 capsule (500 mg total) by mouth 2 (two) times daily for 10 days.  Dispense: 20 capsule; Refill: 0  - Rapid Strep  - Grp A Strep Cult, Throat [E]; Future  - Grp A Strep Cult, Throat [E]                Pt indicates understanding and agrees to the plan.     No follow-ups on file.    ROGELIO Blanco

## 2024-05-30 NOTE — TELEPHONE ENCOUNTER
Pt calling to report he scheduled an appt with PSR for tomorrow 5/31 for a very bad sore throat but was informed by PSR that calling back and speaking with one of the nurses may be able to have pt be seen sooner.  MD +& office schedule fully booked for today, pt asking for sooner appt- lease advise if pt can be accommodated or if WIC/UC recommended.  OV remains as scheduled at this time.    Future Appointments   Date Time Provider Department Center   5/31/2024 10:30 AM Pino Gomez MD [facility]

## 2024-05-30 NOTE — TELEPHONE ENCOUNTER
PSR - Please check with MK/BD about using spot as apt is on hold. If ok, please call and offer apt to pt

## 2024-06-09 DIAGNOSIS — I10 ESSENTIAL HYPERTENSION: ICD-10-CM

## 2024-06-12 RX ORDER — LISINOPRIL 40 MG/1
40 TABLET ORAL DAILY
Qty: 90 TABLET | Refills: 3 | Status: SHIPPED | OUTPATIENT
Start: 2024-06-12

## 2024-06-12 RX ORDER — AMLODIPINE BESYLATE 5 MG/1
5 TABLET ORAL DAILY
Qty: 90 TABLET | Refills: 3 | Status: SHIPPED | OUTPATIENT
Start: 2024-06-12

## 2024-06-12 NOTE — TELEPHONE ENCOUNTER
REFILL PASSED PER [facility] PROTOCOLS    Requested Prescriptions   Pending Prescriptions Disp Refills    AMLODIPINE 5 MG Oral Tab [Pharmacy Med Name: AMLODIPINE BESYLATE 5 MG TAB] 90 tablet 0     Sig: TAKE 1 TABLET (5 MG TOTAL) BY MOUTH DAILY.       Hypertension Medications Protocol Passed - 6/9/2024  7:25 AM        Passed - CMP or BMP in past 12 months        Passed - Last BP reading less than 140/90     BP Readings from Last 1 Encounters:   05/30/24 128/82               Passed - In person appointment or virtual visit in the past 12 mos or appointment in next 3 mos     Recent Outpatient Visits              1 week ago Sore throat    [facility], [address] [place] [address], Taylor Lackey APRN    Office Visit    3 months ago Pure hypertriglyceridemia    [address], Belgica Ramey MD    Office Visit    8 months ago Wellness examination    [address], Pino Lara MD    Office Visit    9 months ago Lumbar radiculitis    [facility], [facility], [place]    Office Visit    9 months ago Lumbar disc disease    [address], Kj Barillas MD    Office Visit                      Passed - EGFRCR or GFRNAA > 50     GFR Evaluation  EGFRCR: 68 , resulted on 10/14/2023            LISINOPRIL 40 MG Oral Tab [Pharmacy Med Name: LISINOPRIL 40 MG TABLET] 90 tablet 0     Sig: TAKE 1 TABLET BY MOUTH EVERY DAY       Hypertension Medications Protocol Passed - 6/9/2024  7:25 AM        Passed - CMP or BMP in past 12 months        Passed - Last BP reading less than 140/90     BP Readings from Last 1 Encounters:   05/30/24 128/82               Passed - In person appointment or virtual visit in the past 12 mos or appointment in next 3 mos     Recent Outpatient Visits              1 week ago Sore throat    [facility], [address] [place] [address],  Taylor Lackey APRN    Office Visit    3 months ago Pure hypertriglyceridemia    [facility], [address] [place] [address], Belgica Ramey MD    Office Visit    8 months ago Wellness examination    [facility], [address] [place], [place] [address]Mary Michael, MD    Office Visit    9 months ago Lumbar radiculitis    [facility], [facility] [place]    Office Visit    9 months ago Lumbar disc disease    [facility], [address] [place] [address], Kj Barillas MD    Office Visit                      Passed - EGFRCR or GFRNAA > 50     GFR Evaluation  EGFRCR: 68 , resulted on 10/14/2023                 Recent Outpatient Visits              1 week ago Sore throat    [facility], [address] [place] [address], Taylor Lackey APRN    Office Visit    3 months ago Pure hypertriglyceridemia    [facility], [address] [place] [address], Belgica Ramey MD    Office Visit    8 months ago Wellness examination    [facility], [address] [place] [address], Pino Lara MD    Office Visit    9 months ago Lumbar radiculitis    [facility], [facility] [place]    Office Visit    9 months ago Lumbar disc disease    [facility], [address] [place], [place] [address]Mary Jimmy, MD    Office Visit

## 2024-07-03 DIAGNOSIS — I10 ESSENTIAL HYPERTENSION: ICD-10-CM

## 2024-07-08 RX ORDER — AMLODIPINE BESYLATE 5 MG/1
5 TABLET ORAL DAILY
Qty: 90 TABLET | Refills: 3 | OUTPATIENT
Start: 2024-07-08

## 2024-07-08 RX ORDER — LISINOPRIL 40 MG/1
40 TABLET ORAL DAILY
Qty: 90 TABLET | Refills: 3 | OUTPATIENT
Start: 2024-07-08

## 2024-07-08 NOTE — TELEPHONE ENCOUNTER
Amlodipine 5mg and Lisinopril 40m year supply sent to [facility] in [place] on 2024

## 2025-07-09 ENCOUNTER — TELEPHONE (OUTPATIENT)
Dept: INTERNAL MEDICINE CLINIC | Facility: CLINIC | Age: 45
End: 2025-07-09

## 2025-07-09 DIAGNOSIS — I10 ESSENTIAL HYPERTENSION: ICD-10-CM

## 2025-07-14 RX ORDER — AMLODIPINE BESYLATE 5 MG/1
5 TABLET ORAL DAILY
Qty: 30 TABLET | Refills: 0 | Status: SHIPPED | OUTPATIENT
Start: 2025-07-14

## 2025-07-14 RX ORDER — LISINOPRIL 40 MG/1
40 TABLET ORAL DAILY
Qty: 30 TABLET | Refills: 0 | Status: SHIPPED | OUTPATIENT
Start: 2025-07-14

## 2025-07-14 NOTE — TELEPHONE ENCOUNTER
Spoke with patient informed him information listed below. Patient stated he is not able to schedule at this time. He will call back to schedule.

## 2025-07-14 NOTE — TELEPHONE ENCOUNTER
Please review; protocol failed/ has no protocol      Message sent for patient to make an appointment.

## 2025-07-14 NOTE — TELEPHONE ENCOUNTER
Please call patient to make an appointment and [facility] message sent,thank you.

## 2025-08-29 ENCOUNTER — TELEPHONE (OUTPATIENT)
Age: 45
End: 2025-08-29

## 2025-08-29 ENCOUNTER — OFFICE VISIT (OUTPATIENT)
Age: 45
End: 2025-08-29

## 2025-08-29 VITALS
WEIGHT: 276.81 LBS | DIASTOLIC BLOOD PRESSURE: 82 MMHG | OXYGEN SATURATION: 98 % | HEART RATE: 86 BPM | SYSTOLIC BLOOD PRESSURE: 136 MMHG | BODY MASS INDEX: 39 KG/M2

## 2025-08-29 DIAGNOSIS — M76.72 PERONEAL TENDINITIS OF LEFT LOWER EXTREMITY: ICD-10-CM

## 2025-08-29 DIAGNOSIS — L72.9 SUBCUTANEOUS CYST: ICD-10-CM

## 2025-08-29 DIAGNOSIS — M25.572 CHRONIC PAIN OF LEFT ANKLE: Primary | ICD-10-CM

## 2025-08-29 DIAGNOSIS — G89.29 CHRONIC PAIN OF LEFT ANKLE: Primary | ICD-10-CM

## 2025-08-29 PROCEDURE — 99214 OFFICE O/P EST MOD 30 MIN: CPT | Performed by: FAMILY MEDICINE

## 2025-08-29 RX ORDER — NAPROXEN 500 MG/1
500 TABLET ORAL 2 TIMES DAILY WITH MEALS
Qty: 28 TABLET | Refills: 0 | Status: SHIPPED | OUTPATIENT
Start: 2025-08-29 | End: 2025-09-12

## (undated) DIAGNOSIS — Z01.818 PREOP EXAMINATION: ICD-10-CM

## (undated) DIAGNOSIS — Z13.29 SCREENING FOR THYROID DISORDER: Primary | ICD-10-CM

## (undated) DIAGNOSIS — M20.5X1 HALLUX LIMITUS OF RIGHT FOOT: ICD-10-CM

## (undated) DIAGNOSIS — Z13.0 SCREENING FOR DISORDER OF BLOOD AND BLOOD-FORMING ORGANS: ICD-10-CM

## (undated) DIAGNOSIS — Z13.228 ENCOUNTER FOR SCREENING FOR METABOLIC DISORDER: ICD-10-CM

## (undated) DIAGNOSIS — I10 ESSENTIAL HYPERTENSION: ICD-10-CM

## (undated) DIAGNOSIS — E87.6 HYPOKALEMIA: ICD-10-CM

## (undated) DIAGNOSIS — Z13.220 SCREENING FOR LIPID DISORDERS: ICD-10-CM

## (undated) DIAGNOSIS — Z12.5 SCREENING FOR PROSTATE CANCER: ICD-10-CM

## (undated) DEVICE — NEEDLE SPNL 22GA L5IN BLK HUB QNCKE BVL DISP

## (undated) DEVICE — GLV SURG SZ 7.5 THK10MIL WHT ISOLEX SYN

## (undated) DEVICE — PAIN TRAY: Brand: MEDLINE INDUSTRIES, INC.

## (undated) DEVICE — REMOVER LOT 4OZ NONIRRITATING UNSCNT SFT

## (undated) DEVICE — GLOVE SUR 7 SENSICARE PIP WHT PWD F

## (undated) DEVICE — BANDAGE ADH W1INXL3IN NAT FAB WVN N ADH PD

## (undated) NOTE — LETTER
02/18/20        [address] [facility]  [address] [facility]      Dear José Bernard,    [facility] records indicate that you have outstanding lab work and or testing that was ordered for you and has not yet been completed: Fasting lab work - Make sure you

## (undated) NOTE — LETTER
03/28/22  H&P REQUEST        Harman Socks  8/2/1980    Surgeon:Dr. Deidra Poon    Dx:Right foot hallux limitus of right foot    Surgical Procedure:Right foot cheilectomy with biologic     Date of Surgery:4/11/2022      In order to proceed with surgery, a physical within 30 days of the procedure is required for medical clearance.  Please fax a copy to:    [facility] [phone]  [facility]-Fax [phone]

## (undated) NOTE — ED AVS SNAPSHOT
[facility] in [address] [place] [address] Drive,[address]    [address] [place] [address]    Phone:  [phone]    Fax:  [phone]           Phi Pandey   MRN: [id number]    Department:  [facility] in [facility]   Date of Visit:  2/9/2017 Juanjo Crenshaw [id number]     Phone:  [phone]    - Cephalexin 500 MG Tabs              Discharge Instructions       Please return to the ER/clinic if symptoms worsen. Follow-up with your PCP in 24-48 hours as needed. Take medications as prescribed.  Complet this physician (or your personal doctor if your instructions are to return to your personal doctor) about any new or lasting problems. The primary care or specialist physician will see patients referred from the [facility].  Follow-up care is at - If you are a smoker or have smoked in the last 12 months, we encourage you to explore options for quitting.     - If you have concerns related to behavioral health issues or thoughts of harming yourself, contact [facility] a

## (undated) NOTE — LETTER
04/08/22  Srinivas Gaona  8/2/1980  POST OP INSTRUCTIONS  1. Rest, minimize your activities. 2. Keep all splints, braces, slings, immobilizers and dressings on unless instructed differently. 3. Take pain medication as directed. 4. Keep your surgical dressing dry. The dressing will be removed at your post-op appointment. 5. Regularly move the fingers or toes/ankle of the operative extremity. If you have more than mild swelling, call our office at [phone].  6. Call our office at with any problems or concerns. A 24-hour answering service is available and they will page the doctor on call.     POST OP APPOINTMENT     DATE     TIME    Physician 4/26/2022 2:00pm      PLEASE NOTE THE LOCATION OF YOUR APPOINTMENT :    [address]  [place] [address]        If you are unable to keep the above appointment for any reason, call the office immediately:   [phone]

## (undated) NOTE — MR AVS SNAPSHOT
[place]  [address] [facility] [address]  [address] [facility] [address] [phone]               Thank you for choosing us for your health care visit with Kayode Cano MD.  We are glad to serve you and happy to provide you with this s Expires: 4/10/2017  4:34 PM    If you have questions, you can call [phone] to talk to our [facility] Staff. Remember, [employer] is NOT to be used for urgent needs. For medical emergencies, dial 911.            Visit [email or web address]

## (undated) NOTE — MR AVS SNAPSHOT
[place]  [address] [facility] [address]  [address] [facility] [address] [phone]               Thank you for choosing us for your health care visit with Darby Edmondson MD.  We are glad to serve you and happy to provide you with this s [employer] will allow you to access patient instructions from your recent visit,  view other health information, and more. To sign up or find more information, go to [email or web address] and click on the Sign Up Now link in the [employer] box.      Enter

## (undated) NOTE — LETTER
03/28/22  Srinivas Gaona  8/2/1980  POST OP INSTRUCTIONS  1. Rest, minimize your activities. 2. Keep all splints, braces, slings, immobilizers and dressings on unless instructed differently. 3. Take pain medication as directed. 4. Keep your surgical dressing dry. The dressing will be removed at your post-op appointment. 5. Regularly move the fingers or toes/ankle of the operative extremity. If you have more than mild swelling, call our office at [phone].  6. Call our office at with any problems or concerns. A 24-hour answering service is available and they will page the doctor on call.     POST OP APPOINTMENT     DATE     TIME    Physician 4/12/22 2:15pm     PLEASE NOTE THE LOCATION OF YOUR APPOINTMENT :    [address]  Mariela HERNANDEZ [place] [address]        If you are unable to keep the above appointment for any reason, call the office immediately:   [phone]